# Patient Record
Sex: FEMALE | Race: WHITE | NOT HISPANIC OR LATINO | Employment: OTHER | URBAN - NONMETROPOLITAN AREA
[De-identification: names, ages, dates, MRNs, and addresses within clinical notes are randomized per-mention and may not be internally consistent; named-entity substitution may affect disease eponyms.]

---

## 2021-01-22 ENCOUNTER — TELEPHONE (OUTPATIENT)
Dept: CARDIOLOGY | Facility: CLINIC | Age: 86
End: 2021-01-22

## 2021-01-22 NOTE — TELEPHONE ENCOUNTER
DR. DARIUS CHAMBERS , A GERIATRIC REHABILITAION DOCTOR, SENDING YOU EKG RESULTS FOR YOU TO LOOK AT,,,,,,,,,,,,

## 2021-08-30 ENCOUNTER — OFFICE VISIT (OUTPATIENT)
Dept: CARDIOLOGY | Facility: CLINIC | Age: 86
End: 2021-08-30

## 2021-08-30 VITALS
DIASTOLIC BLOOD PRESSURE: 68 MMHG | TEMPERATURE: 99 F | WEIGHT: 114 LBS | SYSTOLIC BLOOD PRESSURE: 114 MMHG | BODY MASS INDEX: 18.99 KG/M2 | HEART RATE: 89 BPM | RESPIRATION RATE: 12 BRPM | OXYGEN SATURATION: 99 % | HEIGHT: 65 IN

## 2021-08-30 DIAGNOSIS — I27.82 CHRONIC PULMONARY EMBOLISM, UNSPECIFIED PULMONARY EMBOLISM TYPE, UNSPECIFIED WHETHER ACUTE COR PULMONALE PRESENT (HCC): ICD-10-CM

## 2021-08-30 DIAGNOSIS — E78.5 HYPERLIPIDEMIA LDL GOAL <100: ICD-10-CM

## 2021-08-30 DIAGNOSIS — I48.20 ATRIAL FIBRILLATION, CHRONIC (HCC): ICD-10-CM

## 2021-08-30 DIAGNOSIS — I10 ESSENTIAL HYPERTENSION: Primary | ICD-10-CM

## 2021-08-30 PROCEDURE — 99214 OFFICE O/P EST MOD 30 MIN: CPT | Performed by: INTERNAL MEDICINE

## 2021-08-30 PROCEDURE — 93000 ELECTROCARDIOGRAM COMPLETE: CPT | Performed by: INTERNAL MEDICINE

## 2021-08-30 RX ORDER — METOPROLOL SUCCINATE 50 MG/1
50 TABLET, EXTENDED RELEASE ORAL DAILY
Qty: 90 TABLET | Refills: 1 | Status: SHIPPED | OUTPATIENT
Start: 2021-08-30 | End: 2022-02-23 | Stop reason: SDUPTHER

## 2021-08-30 NOTE — PROGRESS NOTES
[facility] [place]  [facility] CARDIOLOGY  [address] [place] [address]  Dept: [phone]  Dept Fax: [phone]    Carol Janeway  1935    New Patient Office Note    History of Present Illness:  Carol Janeway is a 86 y.o. female who presents to the clinic for New patient. For Chronic atrial fibrillation- ,She has seen [facility] cardiology, has chronic atrial fibrillation,and was taking warfarin, ,she has had a stroke, bleeding intraventricular in 12.2020, her warfarin was stop and according to Dr Kennedy the neurosurgeon was advised to restart in March 2021,however patient has not done it, she denies any complaints, her daughter whom I called her to find out more , did not know what kind of stroke or if she was told to restart the blood thinner, she was told by the PA who works with Dr Kennedy to start in 3 march the blood thinner, . She denies any palpitations, SOB, bleeding, chest pain, her Hr is fast 110, she has stop the Toprol also for some reason,no available to me, neither the patient or her daughter knows why, after [facility] she has been at the rehab center for PT,  Her BP is 140.60 on Exforge 5.320. and she takes Crestor 20 mg, no blood thinner at this point, , we have contact her daughter and we discuss this, she is planning to talk to Dr Kennedy, regarding the blood thinner, we discuss briefly about the watchman device ,Will start Toprol xl 50 mg  The following portions of the patient's history were reviewed and updated as appropriate: allergies, current medications, past family history, past medical history, past social history, past surgical history and problem list.    Medications:  amantadine  amLODIPine-valsartan  budesonide-formoterol  dicyclomine  folic acid  melatonin tablet  nicotine  rosuvastatin  tiotropium bromide monohydrate aerosol solution    Subjective  Allergies   Allergen Reactions   • Sulfamethoxazole Hives   • Trimethoprim Nausea And Vomiting        Past  Medical History:   Diagnosis Date   • Acute pulmonary embolism without acute cor pulmonale (CMS/HCC)    • Alcohol use    • At high risk for falls    • Atherosclerosis of native coronary artery of native heart without angina pectoris    • Benign hypertension    • CAD (coronary artery disease)    • Chronic atrial fibrillation (CMS/HCC)    • Chronic obstructive lung disease (CMS/HCC)    • COPD with chronic bronchitis (CMS/HCC)    • Coronary arteriosclerosis in native artery    • High risk medication use    • History of maternal deep vein thrombosis (DVT)    • Hyperlipidemia    • MVP (mitral valve prolapse)     NOT SEEN ON RECENT ECHO   • Osteoporosis    • Pulmonary embolism (CMS/HCC)    • Pulmonary thromboembolism (CMS/HCC)    • Pure hypercholesterolemia    • Thrombocytopenia (CMS/HCC)    • Tobacco dependence syndrome    • Tobacco user        Past Surgical History:   Procedure Laterality Date   • MOHS SURGERY      X2       Family History   Problem Relation Age of Onset   • Stroke Father    • Hypertension Father    • Diabetes Other         Social History     Socioeconomic History   • Marital status:      Spouse name: Not on file   • Number of children: Not on file   • Years of education: Not on file   • Highest education level: Not on file   Tobacco Use   • Smoking status: Former Smoker     Packs/day: 1.00     Types: Cigarettes     Quit date:      Years since quittin.6   • Smokeless tobacco: Never Used   Vaping Use   • Vaping Use: Never used   Substance and Sexual Activity   • Alcohol use: Never   • Drug use: Never   • Sexual activity: Defer       Review of Systems   Constitutional: Negative.    HENT: Negative.    Respiratory: Negative.    Cardiovascular: Negative.    Endocrine: Negative.    Genitourinary: Negative.    Musculoskeletal: Negative.    Skin: Negative.    Allergic/Immunologic: Negative.    Neurological: Negative.    Hematological: Negative.    Psychiatric/Behavioral: Negative.    All other  "systems reviewed and are negative.      Cardiovascular Procedures    ECHO/MUGA:   STRESS TESTS:   CARDIAC CATH:   DEVICES:   HOLTER:   CT/MRI:   VASCULAR:   CARDIOTHORACIC:     Objective  Vitals:    08/30/21 1001   BP: 114/68   BP Location: Right arm   Patient Position: Sitting   Cuff Size: Adult   Pulse: 89   Resp: 12   Temp: 99 °F (37.2 °C)   TempSrc: Infrared   SpO2: 99%   Weight: 51.7 kg (114 lb)   Height: 165.1 cm (65\")   PainSc: 0-No pain       Physical Exam  Constitutional:       Appearance: Healthy appearance. Not in distress.   Neck:      Vascular: No JVR. JVD normal.   Pulmonary:      Effort: Pulmonary effort is normal.      Breath sounds: Normal breath sounds. No wheezing. No rhonchi. No rales.   Chest:      Chest wall: Not tender to palpatation.   Cardiovascular:      PMI at left midclavicular line. Normal rate. Irregularly irregular rhythm. Normal S1. Normal S2.      Murmurs: There is no murmur.      No gallop. No click. No rub.   Pulses:     Intact distal pulses.   Edema:     Peripheral edema absent.   Abdominal:      General: Bowel sounds are normal.      Palpations: Abdomen is soft.      Tenderness: There is no abdominal tenderness.   Musculoskeletal: Normal range of motion.         General: No tenderness. Skin:     General: Skin is warm and dry.   Neurological:      General: No focal deficit present.      Mental Status: Alert and oriented to person, place and time.          Diagnostic Data    ECG 12 Lead    Date/Time: 8/30/2021 4:06 PM  Performed by: Rufus Bradshaw MD  Authorized by: Rufus Bradshaw MD   Comparison: compared with previous ECG   Similar to previous ECG  Rhythm: atrial fibrillation  Rate: tachycardic  BPM: 110  QRS axis: normal    Clinical impression: abnormal EKG            Assessment and Plan  Diagnoses and all orders for this visit:    Essential hypertension- BP is 140.60., on Exforge 5,320.,, will restart Toprol xl 50 mg    Atrial fibrillation, chronic (CMS/HCC)- " rate control ,HR is fast , will restart Toprol xl 50 mg, will hold blood thinner until patient and daughter. Maker her mind,     Hyperlipidemia LDL goal <100- On Crestor 20 mg    Chronic pulmonary embolism, unspecified pulmonary embolism type, unspecified whether acute cor pulmonale present (CMS/[facility])- she has not been taking any blood thinner        Return in about 1 month (around 9/30/2021) for Recheck.    Rufus Bradshaw MD  08/30/2021

## 2021-08-31 ENCOUNTER — TELEPHONE (OUTPATIENT)
Dept: CARDIOLOGY | Facility: CLINIC | Age: 86
End: 2021-08-31

## 2021-08-31 LAB
ALBUMIN SERPL-MCNC: 4.6 G/DL (ref 3.6–4.6)
ALBUMIN/GLOB SERPL: 1.6 {RATIO} (ref 1.2–2.2)
ALP SERPL-CCNC: 122 IU/L (ref 48–121)
ALT SERPL-CCNC: 13 IU/L (ref 0–32)
AST SERPL-CCNC: 26 IU/L (ref 0–40)
BASOPHILS # BLD AUTO: 0 X10E3/UL (ref 0–0.2)
BASOPHILS NFR BLD AUTO: 0 %
BILIRUB SERPL-MCNC: 1 MG/DL (ref 0–1.2)
BUN SERPL-MCNC: 19 MG/DL (ref 8–27)
BUN/CREAT SERPL: 30 (ref 12–28)
CALCIUM SERPL-MCNC: 10 MG/DL (ref 8.7–10.3)
CHLORIDE SERPL-SCNC: 104 MMOL/L (ref 96–106)
CO2 SERPL-SCNC: 23 MMOL/L (ref 20–29)
CREAT SERPL-MCNC: 0.63 MG/DL (ref 0.57–1)
EOSINOPHIL # BLD AUTO: 0 X10E3/UL (ref 0–0.4)
EOSINOPHIL NFR BLD AUTO: 1 %
ERYTHROCYTE [DISTWIDTH] IN BLOOD BY AUTOMATED COUNT: 13.9 % (ref 11.7–15.4)
GLOBULIN SER CALC-MCNC: 2.8 G/DL (ref 1.5–4.5)
GLUCOSE SERPL-MCNC: 92 MG/DL (ref 65–99)
HCT VFR BLD AUTO: 38.6 % (ref 34–46.6)
HGB BLD-MCNC: 13 G/DL (ref 11.1–15.9)
IMM GRANULOCYTES # BLD AUTO: 0 X10E3/UL (ref 0–0.1)
IMM GRANULOCYTES NFR BLD AUTO: 0 %
LYMPHOCYTES # BLD AUTO: 1.2 X10E3/UL (ref 0.7–3.1)
LYMPHOCYTES NFR BLD AUTO: 15 %
MCH RBC QN AUTO: 31.9 PG (ref 26.6–33)
MCHC RBC AUTO-ENTMCNC: 33.7 G/DL (ref 31.5–35.7)
MCV RBC AUTO: 95 FL (ref 79–97)
MONOCYTES # BLD AUTO: 0.6 X10E3/UL (ref 0.1–0.9)
MONOCYTES NFR BLD AUTO: 8 %
NEUTROPHILS # BLD AUTO: 5.9 X10E3/UL (ref 1.4–7)
NEUTROPHILS NFR BLD AUTO: 76 %
PLATELET # BLD AUTO: 158 X10E3/UL (ref 150–450)
POTASSIUM SERPL-SCNC: 4.3 MMOL/L (ref 3.5–5.2)
PROT SERPL-MCNC: 7.4 G/DL (ref 6–8.5)
RBC # BLD AUTO: 4.08 X10E6/UL (ref 3.77–5.28)
SODIUM SERPL-SCNC: 142 MMOL/L (ref 134–144)
WBC # BLD AUTO: 7.8 X10E3/UL (ref 3.4–10.8)

## 2021-08-31 NOTE — TELEPHONE ENCOUNTER
Lab is good, please ask about if the daughter was able to talk to Dr VASQUEZ? For us to start on Blood thinner ? Pt could have stroke not being on blood thinner. Left message for pt

## 2021-08-31 NOTE — TELEPHONE ENCOUNTER
Lab is good, please ask about if the daughter was able to talk to Dr VASQUEZ? For us to start on Blood thinner .  I spoke with pt ionaor she is going to call pt and ask her what she wants to do . Pt will call us tomorrow .

## 2021-09-02 ENCOUNTER — TELEPHONE (OUTPATIENT)
Dept: CARDIOLOGY | Facility: CLINIC | Age: 86
End: 2021-09-02

## 2021-09-07 ENCOUNTER — TELEPHONE (OUTPATIENT)
Dept: CARDIOLOGY | Facility: CLINIC | Age: 86
End: 2021-09-07

## 2021-09-07 NOTE — TELEPHONE ENCOUNTER
amLODIPine-valsartan (EXFORGE) 5-320 MG per tablet    [facility] [place], [place] - [address] AT [facility] [address] - [phone]  - [phone] FX          90 day

## 2021-09-08 RX ORDER — AMLODIPINE AND VALSARTAN 5; 320 MG/1; MG/1
1 TABLET ORAL DAILY
Qty: 90 TABLET | Refills: 3 | Status: SHIPPED | OUTPATIENT
Start: 2021-09-08 | End: 2021-10-18

## 2021-09-13 ENCOUNTER — OFFICE VISIT (OUTPATIENT)
Dept: CARDIOLOGY | Facility: CLINIC | Age: 86
End: 2021-09-13

## 2021-09-13 VITALS
HEIGHT: 65 IN | HEART RATE: 76 BPM | TEMPERATURE: 97.1 F | OXYGEN SATURATION: 92 % | SYSTOLIC BLOOD PRESSURE: 128 MMHG | WEIGHT: 115 LBS | DIASTOLIC BLOOD PRESSURE: 80 MMHG | RESPIRATION RATE: 15 BRPM | BODY MASS INDEX: 19.16 KG/M2

## 2021-09-13 DIAGNOSIS — I48.20 ATRIAL FIBRILLATION, CHRONIC (HCC): Primary | ICD-10-CM

## 2021-09-13 DIAGNOSIS — I10 ESSENTIAL HYPERTENSION: ICD-10-CM

## 2021-09-13 DIAGNOSIS — E78.5 HYPERLIPIDEMIA LDL GOAL <100: ICD-10-CM

## 2021-09-13 DIAGNOSIS — I27.82 CHRONIC PULMONARY EMBOLISM, UNSPECIFIED PULMONARY EMBOLISM TYPE, UNSPECIFIED WHETHER ACUTE COR PULMONALE PRESENT (HCC): ICD-10-CM

## 2021-09-13 PROCEDURE — 99214 OFFICE O/P EST MOD 30 MIN: CPT | Performed by: INTERNAL MEDICINE

## 2021-09-13 NOTE — PROGRESS NOTES
[facility] [place]  [facility] CARDIOLOGY  [address] [place] [address]  Dept: [phone]  Dept Fax: [phone]    Carol Janeway  1935    Follow Up Office Visit Note    History of Present Illness:  Carol Janeway is a 86 y.o. female who presents to the clinic for Follow-up. Chronic atrial fibrillation- She is asymptomatic, HR is better 94. From 110, on Toprol xl 50 mg, also started taking Eliquis 5mg bid, , no bleeding. Will keep same meds, will see her back in 1 month, we might need to increase Toprol     The following portions of the patient's history were reviewed and updated as appropriate: allergies, current medications, past family history, past medical history, past social history, past surgical history and problem list.    Medications:  amantadine  amLODIPine-valsartan  apixaban tablet  budesonide-formoterol  dicyclomine  folic acid  melatonin tablet  metoprolol succinate XL  nicotine  rosuvastatin  tiotropium bromide monohydrate aerosol solution    Subjective  Allergies   Allergen Reactions   • Sulfamethoxazole Hives   • Trimethoprim Nausea And Vomiting        Past Medical History:   Diagnosis Date   • Acute pulmonary embolism without acute cor pulmonale (CMS/HCC)    • Alcohol use    • At high risk for falls    • Atherosclerosis of native coronary artery of native heart without angina pectoris    • Benign hypertension    • CAD (coronary artery disease)    • Chronic atrial fibrillation (CMS/HCC)    • Chronic obstructive lung disease (CMS/HCC)    • COPD with chronic bronchitis (CMS/HCC)    • Coronary arteriosclerosis in native artery    • High risk medication use    • History of maternal deep vein thrombosis (DVT)    • Hyperlipidemia    • MVP (mitral valve prolapse)     NOT SEEN ON RECENT ECHO   • Osteoporosis    • Pulmonary embolism (CMS/HCC)    • Pulmonary thromboembolism (CMS/HCC)    • Pure hypercholesterolemia    • Thrombocytopenia (CMS/HCC)    • Tobacco dependence  "syndrome    • Tobacco user        Past Surgical History:   Procedure Laterality Date   • MOHS SURGERY      X2       Family History   Problem Relation Age of Onset   • Stroke Father    • Hypertension Father    • Diabetes Other         Social History     Socioeconomic History   • Marital status:      Spouse name: Not on file   • Number of children: Not on file   • Years of education: Not on file   • Highest education level: Not on file   Tobacco Use   • Smoking status: Former Smoker     Packs/day: 1.00     Types: Cigarettes     Quit date:      Years since quittin.7   • Smokeless tobacco: Never Used   Vaping Use   • Vaping Use: Never used   Substance and Sexual Activity   • Alcohol use: Never   • Drug use: Never   • Sexual activity: Defer       Review of Systems   Constitutional: Negative.    HENT: Negative.    Respiratory: Negative.    Cardiovascular: Negative.    Endocrine: Negative.    Genitourinary: Negative.    Musculoskeletal: Negative.    Skin: Negative.    Allergic/Immunologic: Negative.    Neurological: Negative.    Hematological: Negative.    Psychiatric/Behavioral: Negative.    All other systems reviewed and are negative.      Cardiovascular Procedures    ECHO/MUGA:   STRESS TESTS:   CARDIAC CATH:   DEVICES:   HOLTER:   CT/MRI:   VASCULAR:   CARDIOTHORACIC:     Objective  Vitals:    21 1548   BP: 128/80   BP Location: Left arm   Patient Position: Sitting   Cuff Size: Adult   Pulse: 76   Resp: 15   Temp: 97.1 °F (36.2 °C)   TempSrc: Infrared   SpO2: 92%   Weight: 52.2 kg (115 lb)   Height: 165.1 cm (65\")   PainSc: 0-No pain     Body mass index is 19.14 kg/m².     Physical Exam  Constitutional:       Appearance: Healthy appearance. Not in distress.   Neck:      Vascular: No JVR. JVD normal.   Pulmonary:      Effort: Pulmonary effort is normal.      Breath sounds: Normal breath sounds. No wheezing. No rhonchi. No rales.   Chest:      Chest wall: Not tender to palpatation.   Cardiovascular: "      PMI at left midclavicular line. Normal rate. Irregularly irregular rhythm. Normal S1. Normal S2.      Murmurs: There is no murmur.      No gallop. No click. No rub.   Pulses:     Intact distal pulses.   Edema:     Peripheral edema absent.   Abdominal:      General: Bowel sounds are normal.      Palpations: Abdomen is soft.      Tenderness: There is no abdominal tenderness.   Musculoskeletal: Normal range of motion.         General: No tenderness. Skin:     General: Skin is warm and dry.   Neurological:      General: No focal deficit present.      Mental Status: Alert and oriented to person, place and time.          Diagnostic Data  Procedures    Assessment and Plan  Diagnoses and all orders for this visit:    Atrial fibrillation, chronic (CMS/HCC)- Rate control, better on Toprol xl 50 mg, and also on Eliquis, , will see her back in 1 month    Essential hypertension- The BP is 110.60 on Toprol xl 50 mg and Exforge 5,320,    Hyperlipidemia LDL goal <100- On Crestor 20 mg    Chronic pulmonary embolism, unspecified pulmonary embolism type, unspecified whether acute cor pulmonale present (CMS/[facility])- Now also on Eliquis          Return in about 1 month (around 10/13/2021) for Recheck.    Rufus Bradshaw MD  09/13/2021

## 2021-10-11 ENCOUNTER — OFFICE VISIT (OUTPATIENT)
Dept: CARDIOLOGY | Facility: CLINIC | Age: 86
End: 2021-10-11

## 2021-10-11 VITALS
DIASTOLIC BLOOD PRESSURE: 74 MMHG | HEIGHT: 65 IN | SYSTOLIC BLOOD PRESSURE: 118 MMHG | BODY MASS INDEX: 20.66 KG/M2 | TEMPERATURE: 97 F | WEIGHT: 124 LBS | RESPIRATION RATE: 12 BRPM | OXYGEN SATURATION: 88 % | HEART RATE: 86 BPM

## 2021-10-11 DIAGNOSIS — I27.82 CHRONIC PULMONARY EMBOLISM, UNSPECIFIED PULMONARY EMBOLISM TYPE, UNSPECIFIED WHETHER ACUTE COR PULMONALE PRESENT (HCC): ICD-10-CM

## 2021-10-11 DIAGNOSIS — E78.5 HYPERLIPIDEMIA LDL GOAL <100: ICD-10-CM

## 2021-10-11 DIAGNOSIS — I48.20 ATRIAL FIBRILLATION, CHRONIC (HCC): Primary | ICD-10-CM

## 2021-10-11 DIAGNOSIS — R06.02 SHORTNESS OF BREATH: ICD-10-CM

## 2021-10-11 DIAGNOSIS — I10 ESSENTIAL HYPERTENSION: ICD-10-CM

## 2021-10-11 PROCEDURE — 99214 OFFICE O/P EST MOD 30 MIN: CPT | Performed by: INTERNAL MEDICINE

## 2021-10-11 RX ORDER — TORSEMIDE 100 MG/1
100 TABLET ORAL DAILY
Qty: 30 TABLET | Refills: 0 | Status: SHIPPED | OUTPATIENT
Start: 2021-10-11 | End: 2021-10-18

## 2021-10-11 NOTE — PROGRESS NOTES
[facility] [place]  [facility] CARDIOLOGY  [address] [place] [address]  Dept: [phone]  Dept Fax: [phone]    Carol Janeway  1935    Follow Up Office Visit Note    History of Present Illness:  Carol Janeway is a 86 y.o. female who presents to the clinic for Follow-up, Shortness of Breath, and Edema.  For the last few weeks has noticed SOB and edema, last night could not sleep due to SOB, the legs has 2-3 plus edema , she has had CXR with Dr Hart and looks abnormal, , by clinical exam she has crackles in both bases, BP is 105.60, will use Torsemide 100 mg, he seems to have CHF, will get an echo, and also BNP , will see her  back in 1 week, patient was advised to go to ER if she gets worse     The following portions of the patient's history were reviewed and updated as appropriate: allergies, current medications, past family history, past medical history, past social history, past surgical history and problem list.    Medications:  amantadine  amLODIPine-valsartan  apixaban tablet  budesonide-formoterol  dicyclomine  folic acid  melatonin tablet  metoprolol succinate XL  nicotine  rosuvastatin  tiotropium bromide monohydrate aerosol solution  torsemide    Subjective  Allergies   Allergen Reactions   • Sulfamethoxazole Hives   • Trimethoprim Nausea And Vomiting        Past Medical History:   Diagnosis Date   • Acute pulmonary embolism without acute cor pulmonale (HCC)    • Alcohol use    • At high risk for falls    • Atherosclerosis of native coronary artery of native heart without angina pectoris    • Benign hypertension    • CAD (coronary artery disease)    • Chronic atrial fibrillation (HCC)    • Chronic obstructive lung disease (HCC)    • COPD with chronic bronchitis (HCC)    • Coronary arteriosclerosis in native artery    • High risk medication use    • History of maternal deep vein thrombosis (DVT)    • Hyperlipidemia    • MVP (mitral valve prolapse)     NOT SEEN ON  "RECENT ECHO   • Osteoporosis    • Pulmonary embolism (HCC)    • Pulmonary thromboembolism (HCC)    • Pure hypercholesterolemia    • Thrombocytopenia (HCC)    • Tobacco dependence syndrome    • Tobacco user        Past Surgical History:   Procedure Laterality Date   • MOHS SURGERY      X2       Family History   Problem Relation Age of Onset   • Stroke Father    • Hypertension Father    • Diabetes Other         Social History     Socioeconomic History   • Marital status:    Tobacco Use   • Smoking status: Former Smoker     Packs/day: 1.00     Types: Cigarettes     Quit date:      Years since quittin.7   • Smokeless tobacco: Never Used   Vaping Use   • Vaping Use: Never used   Substance and Sexual Activity   • Alcohol use: Never   • Drug use: Never   • Sexual activity: Defer       Review of Systems   Constitutional: Negative.    HENT: Negative.    Respiratory: Positive for shortness of breath.    Cardiovascular: Positive for leg swelling.   Endocrine: Negative.    Genitourinary: Negative.    Musculoskeletal: Negative.    Skin: Negative.    Allergic/Immunologic: Negative.    Neurological: Negative.    Hematological: Negative.    Psychiatric/Behavioral: Negative.        Cardiovascular Procedures    ECHO/MUGA:   STRESS TESTS:   CARDIAC CATH:   DEVICES:   HOLTER:   CT/MRI:   VASCULAR:   CARDIOTHORACIC:     Objective  Vitals:    10/11/21 1455   BP: 118/74   BP Location: Left arm   Patient Position: Sitting   Cuff Size: Adult   Pulse: 86   Resp: 12   Temp: 97 °F (36.1 °C)   TempSrc: Infrared   SpO2: (!) 88%   Weight: 56.2 kg (124 lb)   Height: 165.1 cm (65\")   PainSc: 0-No pain     Body mass index is 20.63 kg/m².     Physical Exam  Constitutional:       Appearance: Healthy appearance. Not in distress.   Neck:      Vascular: No JVR. JVD normal.   Pulmonary:      Effort: Pulmonary effort is normal.      Breath sounds: Normal breath sounds. No wheezing. No rhonchi. No rales.   Chest:      Chest wall: Not tender " to palpatation.   Cardiovascular:      PMI at left midclavicular line. Normal rate. Regular rhythm. Normal S1. Normal S2.      Murmurs: There is no murmur.      No gallop. No click. No rub.   Pulses:     Intact distal pulses.   Edema:     Thigh: bilateral 2+ edema of the thigh.     Pretibial: bilateral 2+ edema of the pretibial area.     Ankle: bilateral 2+ edema of the ankle.     Feet: bilateral 2+ edema of the feet.  Abdominal:      General: Bowel sounds are normal.      Palpations: Abdomen is soft.      Tenderness: There is no abdominal tenderness.   Musculoskeletal: Normal range of motion.         General: No tenderness. Skin:     General: Skin is warm and dry.   Neurological:      General: No focal deficit present.      Mental Status: Alert and oriented to person, place and time.          Diagnostic Data  Procedures    Assessment and Plan  Diagnoses and all orders for this visit:    Atrial fibrillation, chronic (HCC)- Rate control on Toprol xl 50 mg and Eliquis 5mg bid  -     Adult Transthoracic Echo Complete W/ Cont if Necessary Per Protocol; Future  -     Comprehensive Metabolic Panel  -     CBC & Differential    Essential hypertension- the BP is 105.60, on Toprol xl 50 mg  And Exforge 5,320,    Hyperlipidemia LDL goal <100- On Crestor     Chronic pulmonary embolism, unspecified pulmonary embolism type, unspecified whether acute cor pulmonale present (HCC)- On Eliquis     Shortness of breath- Seems likely heart failure - , edema, SOB, will get a BNP., will get an echo and also will sent Torsemide 100 mg daily  -     proBNP    Other orders  -     torsemide (DEMADEX) 100 MG tablet; Take 1 tablet by mouth Daily.         Return in about 1 week (around 10/18/2021) for Recheck.    Rufus Bradshaw MD  10/11/2021

## 2021-10-12 ENCOUNTER — LAB (OUTPATIENT)
Dept: CARDIOLOGY | Facility: CLINIC | Age: 86
End: 2021-10-12

## 2021-10-13 ENCOUNTER — TELEPHONE (OUTPATIENT)
Dept: CARDIOLOGY | Facility: CLINIC | Age: 86
End: 2021-10-13

## 2021-10-13 LAB
ALBUMIN SERPL-MCNC: 4.2 G/DL (ref 3.6–4.6)
ALBUMIN/GLOB SERPL: 1.4 {RATIO} (ref 1.2–2.2)
ALP SERPL-CCNC: 110 IU/L (ref 44–121)
ALT SERPL-CCNC: 13 IU/L (ref 0–32)
AST SERPL-CCNC: 35 IU/L (ref 0–40)
BASOPHILS # BLD AUTO: 0 X10E3/UL (ref 0–0.2)
BASOPHILS NFR BLD AUTO: 0 %
BILIRUB SERPL-MCNC: 1.5 MG/DL (ref 0–1.2)
BUN SERPL-MCNC: 20 MG/DL (ref 8–27)
BUN/CREAT SERPL: 21 (ref 12–28)
CALCIUM SERPL-MCNC: 9.5 MG/DL (ref 8.7–10.3)
CHLORIDE SERPL-SCNC: 94 MMOL/L (ref 96–106)
CO2 SERPL-SCNC: 23 MMOL/L (ref 20–29)
CREAT SERPL-MCNC: 0.95 MG/DL (ref 0.57–1)
EOSINOPHIL # BLD AUTO: 0 X10E3/UL (ref 0–0.4)
EOSINOPHIL NFR BLD AUTO: 0 %
ERYTHROCYTE [DISTWIDTH] IN BLOOD BY AUTOMATED COUNT: 15.3 % (ref 11.7–15.4)
GLOBULIN SER CALC-MCNC: 3 G/DL (ref 1.5–4.5)
GLUCOSE SERPL-MCNC: 88 MG/DL (ref 65–99)
HCT VFR BLD AUTO: 39.4 % (ref 34–46.6)
HGB BLD-MCNC: 12.9 G/DL (ref 11.1–15.9)
IMM GRANULOCYTES # BLD AUTO: 0 X10E3/UL (ref 0–0.1)
IMM GRANULOCYTES NFR BLD AUTO: 0 %
LYMPHOCYTES # BLD AUTO: 1.3 X10E3/UL (ref 0.7–3.1)
LYMPHOCYTES NFR BLD AUTO: 14 %
MCH RBC QN AUTO: 31.3 PG (ref 26.6–33)
MCHC RBC AUTO-ENTMCNC: 32.7 G/DL (ref 31.5–35.7)
MCV RBC AUTO: 96 FL (ref 79–97)
MONOCYTES # BLD AUTO: 0.8 X10E3/UL (ref 0.1–0.9)
MONOCYTES NFR BLD AUTO: 9 %
NEUTROPHILS # BLD AUTO: 7 X10E3/UL (ref 1.4–7)
NEUTROPHILS NFR BLD AUTO: 77 %
NT-PROBNP SERPL-MCNC: 4280 PG/ML (ref 0–738)
PLATELET # BLD AUTO: 197 X10E3/UL (ref 150–450)
POTASSIUM SERPL-SCNC: 3 MMOL/L (ref 3.5–5.2)
PROT SERPL-MCNC: 7.2 G/DL (ref 6–8.5)
RBC # BLD AUTO: 4.12 X10E6/UL (ref 3.77–5.28)
SODIUM SERPL-SCNC: 140 MMOL/L (ref 134–144)
WBC # BLD AUTO: 9.3 X10E3/UL (ref 3.4–10.8)

## 2021-10-13 RX ORDER — POTASSIUM CHLORIDE 1500 MG/1
TABLET, FILM COATED, EXTENDED RELEASE ORAL
Qty: 60 TABLET | Refills: 1 | Status: SHIPPED | OUTPATIENT
Start: 2021-10-13 | End: 2021-12-27

## 2021-10-13 NOTE — TELEPHONE ENCOUNTER
----- Message from Rufus Bradshaw MD sent at 10/13/2021  1:17 PM EDT -----  Add KCL 40 meq daily. Potassium is very low 3.,0., also add magnesium levels .

## 2021-10-13 NOTE — TELEPHONE ENCOUNTER
Tried calling pt daughter and give her, her moms lab results, please let her know that her moms potassium was low so Dr. Bradshaw sent in a med to her pharmacy and that her BNP was elevated which indicated heartfailure.

## 2021-10-18 ENCOUNTER — OFFICE VISIT (OUTPATIENT)
Dept: CARDIOLOGY | Facility: CLINIC | Age: 86
End: 2021-10-18

## 2021-10-18 VITALS
SYSTOLIC BLOOD PRESSURE: 110 MMHG | RESPIRATION RATE: 12 BRPM | OXYGEN SATURATION: 96 % | BODY MASS INDEX: 17.49 KG/M2 | HEART RATE: 86 BPM | DIASTOLIC BLOOD PRESSURE: 66 MMHG | WEIGHT: 105 LBS | HEIGHT: 65 IN | TEMPERATURE: 99 F

## 2021-10-18 DIAGNOSIS — E78.5 HYPERLIPIDEMIA LDL GOAL <100: ICD-10-CM

## 2021-10-18 DIAGNOSIS — I48.20 ATRIAL FIBRILLATION, CHRONIC (HCC): Primary | ICD-10-CM

## 2021-10-18 DIAGNOSIS — I27.82 CHRONIC PULMONARY EMBOLISM, UNSPECIFIED PULMONARY EMBOLISM TYPE, UNSPECIFIED WHETHER ACUTE COR PULMONALE PRESENT (HCC): ICD-10-CM

## 2021-10-18 DIAGNOSIS — R06.02 SHORTNESS OF BREATH: ICD-10-CM

## 2021-10-18 DIAGNOSIS — I10 ESSENTIAL HYPERTENSION: ICD-10-CM

## 2021-10-18 PROCEDURE — 99214 OFFICE O/P EST MOD 30 MIN: CPT | Performed by: INTERNAL MEDICINE

## 2021-10-18 NOTE — PROGRESS NOTES
[facility] [place]  [facility] CARDIOLOGY  [address] [place] [address]  Dept: [phone]  Dept Fax: [phone]    Carol Janeway  1935    Follow Up Office Visit Note    History of Present Illness:  Carol Janeway is a 86 y.o. female who presents to the clinic for Follow-up.CHF- She seems doing better, on torsemide 100 mg, echo moderate diastolic heart failure., and BNP over 4000, will get a BMP, will hold Torsemide, lungs.,are clear, no edema,     The following portions of the patient's history were reviewed and updated as appropriate: allergies, current medications, past family history, past medical history, past social history, past surgical history and problem list.    Medications:  amantadine  apixaban tablet  budesonide-formoterol  dicyclomine  folic acid  melatonin tablet  metoprolol succinate XL  nicotine  potassium chloride ER tablet controlled-release  rosuvastatin  tiotropium bromide monohydrate aerosol solution  torsemide    Subjective  Allergies   Allergen Reactions   • Sulfamethoxazole Hives   • Trimethoprim Nausea And Vomiting        Past Medical History:   Diagnosis Date   • Acute pulmonary embolism without acute cor pulmonale (HCC)    • Alcohol use    • At high risk for falls    • Atherosclerosis of native coronary artery of native heart without angina pectoris    • Benign hypertension    • CAD (coronary artery disease)    • Chronic atrial fibrillation (HCC)    • Chronic obstructive lung disease (HCC)    • COPD with chronic bronchitis (HCC)    • Coronary arteriosclerosis in native artery    • High risk medication use    • History of maternal deep vein thrombosis (DVT)    • Hyperlipidemia    • MVP (mitral valve prolapse)     NOT SEEN ON RECENT ECHO   • Osteoporosis    • Pulmonary embolism (HCC)    • Pulmonary thromboembolism (HCC)    • Pure hypercholesterolemia    • Thrombocytopenia (HCC)    • Tobacco dependence syndrome    • Tobacco user        Past Surgical  "History:   Procedure Laterality Date   • MOHS SURGERY      X2       Family History   Problem Relation Age of Onset   • Stroke Father    • Hypertension Father    • Diabetes Other         Social History     Socioeconomic History   • Marital status:    Tobacco Use   • Smoking status: Former Smoker     Packs/day: 1.00     Types: Cigarettes     Quit date:      Years since quittin.7   • Smokeless tobacco: Never Used   Vaping Use   • Vaping Use: Never used   Substance and Sexual Activity   • Alcohol use: Never   • Drug use: Never   • Sexual activity: Defer       Review of Systems   Constitutional: Negative.    HENT: Negative.    Respiratory: Negative.    Cardiovascular: Negative.    Endocrine: Negative.    Genitourinary: Negative.    Musculoskeletal: Negative.    Skin: Negative.    Allergic/Immunologic: Negative.    Neurological: Negative.    Hematological: Negative.    Psychiatric/Behavioral: Negative.        Cardiovascular Procedures    ECHO/MUGA:   STRESS TESTS:   CARDIAC CATH:   DEVICES:   HOLTER:   CT/MRI:   VASCULAR:   CARDIOTHORACIC:     Objective  Vitals:    10/18/21 1303   BP: 110/66   BP Location: Left arm   Patient Position: Sitting   Cuff Size: Adult   Pulse: 86   Resp: 12   Temp: 99 °F (37.2 °C)   TempSrc: Infrared   SpO2: 96%   Weight: 47.6 kg (105 lb)   Height: 165.1 cm (65\")   PainSc: 0-No pain     Body mass index is 17.47 kg/m².     Physical Exam  Constitutional:       Appearance: Healthy appearance. Not in distress.   Neck:      Vascular: No JVR. JVD normal.   Pulmonary:      Effort: Pulmonary effort is normal.      Breath sounds: Normal breath sounds. No wheezing. No rhonchi. No rales.   Chest:      Chest wall: Not tender to palpatation.   Cardiovascular:      PMI at left midclavicular line. Normal rate. Regular rhythm. Normal S1. Normal S2.      Murmurs: There is no murmur.      No gallop. No click. No rub.   Pulses:     Intact distal pulses.   Edema:     Peripheral edema absent. "   Abdominal:      General: Bowel sounds are normal.      Palpations: Abdomen is soft.      Tenderness: There is no abdominal tenderness.   Musculoskeletal: Normal range of motion.         General: No tenderness. Skin:     General: Skin is warm and dry.   Neurological:      General: No focal deficit present.      Mental Status: Alert and oriented to person, place and time.          Diagnostic Data  Procedures    Assessment and Plan  Diagnoses and all orders for this visit:    Atrial fibrillation, chronic (HCC)- Rate control 86 on Toprol xl 50 mg and Eliquis   -     Basic Metabolic Panel    Essential hypertension- The BP is kind of low 70.50, will hold Exforge and diuretic for now,  -     Basic Metabolic Panel    Hyperlipidemia LDL goal <100- on Crestor 40 mg    Shortness of breath- This seems likely related to CHF, Echo normal Ef, moderate diastolic heart failure,  BNP over 4000 m, will Hold diuretics for now , will get a CXR  -     Basic Metabolic Panel         No follow-ups on file.    Rufus Bradshaw MD  10/18/2021

## 2021-10-19 ENCOUNTER — TELEPHONE (OUTPATIENT)
Dept: CARDIOLOGY | Facility: CLINIC | Age: 86
End: 2021-10-19

## 2021-10-19 DIAGNOSIS — R06.02 SHORTNESS OF BREATH: Primary | ICD-10-CM

## 2021-10-19 LAB
BUN SERPL-MCNC: 59 MG/DL (ref 8–27)
BUN/CREAT SERPL: 43 (ref 12–28)
CALCIUM SERPL-MCNC: 9.6 MG/DL (ref 8.7–10.3)
CHLORIDE SERPL-SCNC: 90 MMOL/L (ref 96–106)
CO2 SERPL-SCNC: 33 MMOL/L (ref 20–29)
CREAT SERPL-MCNC: 1.38 MG/DL (ref 0.57–1)
GLUCOSE SERPL-MCNC: 121 MG/DL (ref 65–99)
MAGNESIUM SERPL-MCNC: 2.1 MG/DL (ref 1.6–2.3)
POTASSIUM SERPL-SCNC: 3 MMOL/L (ref 3.5–5.2)
SODIUM SERPL-SCNC: 141 MMOL/L (ref 134–144)

## 2021-10-19 RX ORDER — TORSEMIDE 20 MG/1
20 TABLET ORAL DAILY
Qty: 90 TABLET | Refills: 1 | Status: SHIPPED | OUTPATIENT
Start: 2021-10-19 | End: 2022-05-20

## 2021-10-19 NOTE — TELEPHONE ENCOUNTER
----- Message from Rufus Bradshaw MD sent at 10/19/2021 10:21 AM EDT -----  Creatinine is a little high 1,3 , hold the water pill until Thursday, then start just 20 mg daily, on Friday Torsemide

## 2021-10-20 ENCOUNTER — TELEPHONE (OUTPATIENT)
Dept: CARDIOLOGY | Facility: CLINIC | Age: 86
End: 2021-10-20

## 2021-10-20 DIAGNOSIS — R06.02 SHORTNESS OF BREATH: Primary | ICD-10-CM

## 2021-10-20 NOTE — TELEPHONE ENCOUNTER
Tried calling pt daughter, left a vm to call back, Dr. Bradshaw would like to speak to her about her mothers chest xray

## 2021-11-03 ENCOUNTER — TELEPHONE (OUTPATIENT)
Dept: CARDIOLOGY | Facility: CLINIC | Age: 86
End: 2021-11-03

## 2021-11-03 NOTE — TELEPHONE ENCOUNTER
Ct chest is abnormal, suggesting COPD and possible pneumonia, she needs to see a lung doctor, please contact PCP ,

## 2021-11-04 NOTE — TELEPHONE ENCOUNTER
Called pt daughter Kristen and gave her the message below..    Ct chest is abnormal, suggesting COPD and possible pneumonia, she needs to see a lung doctor, please contact PCP ,

## 2021-11-08 ENCOUNTER — OFFICE VISIT (OUTPATIENT)
Dept: CARDIOLOGY | Facility: CLINIC | Age: 86
End: 2021-11-08

## 2021-11-08 VITALS
TEMPERATURE: 97 F | RESPIRATION RATE: 12 BRPM | HEIGHT: 65 IN | HEART RATE: 64 BPM | DIASTOLIC BLOOD PRESSURE: 60 MMHG | WEIGHT: 104 LBS | SYSTOLIC BLOOD PRESSURE: 90 MMHG | OXYGEN SATURATION: 99 % | BODY MASS INDEX: 17.33 KG/M2

## 2021-11-08 DIAGNOSIS — I10 ESSENTIAL HYPERTENSION: ICD-10-CM

## 2021-11-08 DIAGNOSIS — R06.02 SHORTNESS OF BREATH: ICD-10-CM

## 2021-11-08 DIAGNOSIS — E78.5 HYPERLIPIDEMIA LDL GOAL <100: ICD-10-CM

## 2021-11-08 DIAGNOSIS — I48.20 ATRIAL FIBRILLATION, CHRONIC (HCC): Primary | ICD-10-CM

## 2021-11-08 DIAGNOSIS — I27.82 CHRONIC PULMONARY EMBOLISM, UNSPECIFIED PULMONARY EMBOLISM TYPE, UNSPECIFIED WHETHER ACUTE COR PULMONALE PRESENT (HCC): ICD-10-CM

## 2021-11-08 PROCEDURE — 99214 OFFICE O/P EST MOD 30 MIN: CPT | Performed by: INTERNAL MEDICINE

## 2021-11-08 NOTE — PROGRESS NOTES
[facility]  [facility]  [address] [place] [address]  Dept: [phone]  Dept Fax: [phone]    Carol Janeway  1935    Follow Up Office Visit Note    History of Present Illness:  Carol Janeway is a 86 y.o. female who presents to the clinic for Follow-up. - Shortness of breath- She has diastolic dysfunction,  And she is better on Torsemide 20 mg, , however she has the Ct chest with calcifications of the coronary arteries and also COPD, will set her for a stress nuclear, she was advised to see Dr Hart for her lungs     The following portions of the patient's history were reviewed and updated as appropriate: allergies, current medications, past family history, past medical history, past social history, past surgical history and problem list.    Medications:  amantadine  apixaban tablet  budesonide-formoterol  dicyclomine  folic acid  melatonin tablet  metoprolol succinate XL  nicotine  potassium chloride ER tablet controlled-release  rosuvastatin  tiotropium bromide monohydrate aerosol solution  torsemide    Subjective  Allergies   Allergen Reactions   • Sulfamethoxazole Hives   • Trimethoprim Nausea And Vomiting        Past Medical History:   Diagnosis Date   • Acute pulmonary embolism without acute cor pulmonale (HCC)    • Alcohol use    • At high risk for falls    • Atherosclerosis of native coronary artery of native heart without angina pectoris    • Benign hypertension    • CAD (coronary artery disease)    • Chronic atrial fibrillation (HCC)    • Chronic obstructive lung disease (HCC)    • COPD with chronic bronchitis (HCC)    • Coronary arteriosclerosis in native artery    • High risk medication use    • History of maternal deep vein thrombosis (DVT)    • Hyperlipidemia    • MVP (mitral valve prolapse)     NOT SEEN ON RECENT ECHO   • Osteoporosis    • Pulmonary embolism (HCC)    • Pulmonary thromboembolism (HCC)    • Pure hypercholesterolemia    •  "Thrombocytopenia (HCC)    • Tobacco dependence syndrome    • Tobacco user        Past Surgical History:   Procedure Laterality Date   • MOHS SURGERY      X2       Family History   Problem Relation Age of Onset   • Stroke Father    • Hypertension Father    • Diabetes Other         Social History     Socioeconomic History   • Marital status:    Tobacco Use   • Smoking status: Former Smoker     Packs/day: 1.00     Types: Cigarettes     Quit date:      Years since quittin.8   • Smokeless tobacco: Never Used   Vaping Use   • Vaping Use: Never used   Substance and Sexual Activity   • Alcohol use: Never   • Drug use: Never   • Sexual activity: Defer       Review of Systems   Constitutional: Negative.    HENT: Negative.    Respiratory: Positive for shortness of breath.    Cardiovascular: Negative.    Endocrine: Negative.    Genitourinary: Negative.    Musculoskeletal: Negative.    Skin: Negative.    Allergic/Immunologic: Negative.    Neurological: Negative.    Hematological: Negative.    Psychiatric/Behavioral: Negative.        Cardiovascular Procedures    ECHO/MUGA:   STRESS TESTS:   CARDIAC CATH:   DEVICES:   HOLTER:   CT/MRI:   VASCULAR:   CARDIOTHORACIC:     Objective  Vitals:    21 1427   BP: 90/60   BP Location: Right arm   Patient Position: Lying   Cuff Size: Adult   Pulse: 64   Resp: 12   Temp: 97 °F (36.1 °C)   TempSrc: Infrared   SpO2: 99%   Weight: 47.2 kg (104 lb)   Height: 165.1 cm (65\")   PainSc: 0-No pain     Body mass index is 17.31 kg/m².     Physical Exam  Constitutional:       Appearance: Healthy appearance. Not in distress.   Neck:      Vascular: No JVR. JVD normal.   Pulmonary:      Effort: Pulmonary effort is normal.      Breath sounds: Normal breath sounds. No wheezing. No rhonchi. No rales.   Chest:      Chest wall: Not tender to palpatation.   Cardiovascular:      PMI at left midclavicular line. Normal rate. Regular rhythm. Normal S1. Normal S2.      Murmurs: There is no murmur. "      No gallop. No click. No rub.   Pulses:     Intact distal pulses.   Edema:     Peripheral edema absent.   Abdominal:      General: Bowel sounds are normal.      Palpations: Abdomen is soft.      Tenderness: There is no abdominal tenderness.   Musculoskeletal: Normal range of motion.         General: No tenderness. Skin:     General: Skin is warm and dry.   Neurological:      General: No focal deficit present.      Mental Status: Alert and oriented to person, place and time.          Diagnostic Data  Procedures    Assessment and Plan  Diagnoses and all orders for this visit:    Atrial fibrillation, chronic (HCC)- rate on Toprol xl 50 mg, will lower the Eliquis to 2,5 bid   -     Stress Test With Myocardial Perfusion One Day; Future    Essential hypertension- The BP is 90.50. only on Toprol and diuretics, Torsemide 20 mg, will get lab     Hyperlipidemia LDL goal <100 On Crestor 40 mg    Chronic pulmonary embolism, unspecified pulmonary embolism type, unspecified whether acute cor pulmonale present (HCC)- On Eliquis 2.,5 bid   -     Basic Metabolic Panel    Shortness of breath- Multifactorial diastolic heart failure COPD and possible severe CAD, will get a lexiscan Nuclear   -     Basic Metabolic Panel  -     Stress Test With Myocardial Perfusion One Day; Future         No follow-ups on file.    Rufus Bradshaw MD  11/08/2021

## 2021-11-09 ENCOUNTER — LAB (OUTPATIENT)
Dept: CARDIOLOGY | Facility: CLINIC | Age: 86
End: 2021-11-09

## 2021-11-09 DIAGNOSIS — I48.20 ATRIAL FIBRILLATION, CHRONIC (HCC): Primary | ICD-10-CM

## 2021-11-10 ENCOUNTER — TELEPHONE (OUTPATIENT)
Dept: CARDIOLOGY | Facility: CLINIC | Age: 86
End: 2021-11-10

## 2021-11-10 LAB
BUN SERPL-MCNC: 27 MG/DL (ref 8–27)
BUN/CREAT SERPL: 31 (ref 12–28)
CALCIUM SERPL-MCNC: 9.2 MG/DL (ref 8.7–10.3)
CHLORIDE SERPL-SCNC: 96 MMOL/L (ref 96–106)
CO2 SERPL-SCNC: 22 MMOL/L (ref 20–29)
CREAT SERPL-MCNC: 0.87 MG/DL (ref 0.57–1)
GLUCOSE SERPL-MCNC: 83 MG/DL (ref 65–99)
POTASSIUM SERPL-SCNC: 4.4 MMOL/L (ref 3.5–5.2)
SODIUM SERPL-SCNC: 136 MMOL/L (ref 134–144)

## 2021-11-10 NOTE — TELEPHONE ENCOUNTER
Lab looks good creatinine is now normal 0.87     LEFT MESSAGE ON PHONE TO PLEASE CALL US BACK .

## 2021-11-15 ENCOUNTER — TELEPHONE (OUTPATIENT)
Dept: CARDIOLOGY | Facility: CLINIC | Age: 86
End: 2021-11-15

## 2021-11-15 NOTE — TELEPHONE ENCOUNTER
Called pt daughter and she thought her mom needed to take HALF tablet of the potassium and I reminded her that it was the eliquis she needed to take half of so from 5mg to 2.5mg a new script for 2.5mg was sent into pharmacy

## 2021-11-15 NOTE — TELEPHONE ENCOUNTER
please call daughter about the script for     potassium chloride (K-TAB) 20 MEQ tablet controlled-release ER tablet    This was never sent in    Because of her weight are they going to cut in half???        [phone]  Kristen       How many milligrams of eliqus is she suppose to take

## 2021-12-14 ENCOUNTER — TELEPHONE (OUTPATIENT)
Dept: CARDIOLOGY | Facility: CLINIC | Age: 86
End: 2021-12-14

## 2021-12-14 NOTE — TELEPHONE ENCOUNTER
----- Message from Carmen Thuanadore sent at 12/13/2021  2:51 PM EST -----  Regarding: Question regarding STRESS TEST WITH MYOCARDIAL PERFUSION ONE DAY  Curious what the numbers indicate.  Is there any information we need to know regarding these results?   Thank you,  Kristen Kinney [phone]

## 2021-12-27 RX ORDER — POTASSIUM CHLORIDE 1500 MG/1
TABLET, FILM COATED, EXTENDED RELEASE ORAL
Qty: 60 TABLET | Refills: 1 | Status: SHIPPED | OUTPATIENT
Start: 2021-12-27 | End: 2022-03-03

## 2022-02-23 ENCOUNTER — TELEPHONE (OUTPATIENT)
Dept: CARDIOLOGY | Facility: CLINIC | Age: 87
End: 2022-02-23

## 2022-02-23 DIAGNOSIS — I10 ESSENTIAL HYPERTENSION: ICD-10-CM

## 2022-02-23 DIAGNOSIS — I48.20 ATRIAL FIBRILLATION, CHRONIC: ICD-10-CM

## 2022-02-23 RX ORDER — METOPROLOL SUCCINATE 50 MG/1
50 TABLET, EXTENDED RELEASE ORAL DAILY
Qty: 90 TABLET | Refills: 1 | Status: SHIPPED | OUTPATIENT
Start: 2022-02-23 | End: 2022-08-26 | Stop reason: SDUPTHER

## 2022-02-23 NOTE — TELEPHONE ENCOUNTER
metoprolol succinate XL (TOPROL-XL) 50 MG 24 hr tablet      [facility] - [place], [place] - [address] AT [facility] [address] - [phone]  - [phone] FX        90 DAY

## 2022-03-03 RX ORDER — POTASSIUM CHLORIDE 1500 MG/1
TABLET, FILM COATED, EXTENDED RELEASE ORAL
Qty: 60 TABLET | Refills: 1 | Status: SHIPPED | OUTPATIENT
Start: 2022-03-03 | End: 2022-05-02

## 2022-03-24 RX ORDER — ROSUVASTATIN CALCIUM 20 MG/1
TABLET, COATED ORAL
Qty: 90 TABLET | Refills: 0 | Status: SHIPPED | OUTPATIENT
Start: 2022-03-24 | End: 2022-06-20

## 2022-04-27 RX ORDER — FOLIC ACID 1 MG/1
TABLET ORAL
Qty: 30 TABLET | Refills: 2 | Status: SHIPPED | OUTPATIENT
Start: 2022-04-27 | End: 2022-07-07

## 2022-05-02 RX ORDER — POTASSIUM CHLORIDE 1500 MG/1
TABLET, FILM COATED, EXTENDED RELEASE ORAL
Qty: 60 TABLET | Refills: 1 | Status: SHIPPED | OUTPATIENT
Start: 2022-05-02 | End: 2022-07-07

## 2022-05-06 DIAGNOSIS — R06.02 SHORTNESS OF BREATH: ICD-10-CM

## 2022-05-06 RX ORDER — TORSEMIDE 100 MG/1
TABLET ORAL
Qty: 30 TABLET | Refills: 0 | OUTPATIENT
Start: 2022-05-06

## 2022-05-12 DIAGNOSIS — R06.02 SHORTNESS OF BREATH: ICD-10-CM

## 2022-05-13 RX ORDER — TORSEMIDE 20 MG/1
TABLET ORAL
Qty: 90 TABLET | Refills: 1 | OUTPATIENT
Start: 2022-05-13

## 2022-05-20 ENCOUNTER — TELEPHONE (OUTPATIENT)
Dept: CARDIOLOGY | Facility: CLINIC | Age: 87
End: 2022-05-20

## 2022-05-20 DIAGNOSIS — R06.02 SHORTNESS OF BREATH: ICD-10-CM

## 2022-05-20 RX ORDER — TORSEMIDE 20 MG/1
20 TABLET ORAL DAILY
Qty: 90 TABLET | Refills: 1 | Status: SHIPPED | OUTPATIENT
Start: 2022-05-20 | End: 2022-08-26 | Stop reason: SDUPTHER

## 2022-05-20 NOTE — TELEPHONE ENCOUNTER
torsemide (DEMADEX) 20 MG tablet      [facility] - [place], [place] - [address] AT [facility] [address] - [phone]  - [phone] FX

## 2022-06-20 RX ORDER — ROSUVASTATIN CALCIUM 20 MG/1
TABLET, COATED ORAL
Qty: 90 TABLET | Refills: 0 | Status: SHIPPED | OUTPATIENT
Start: 2022-06-20 | End: 2022-07-07

## 2022-07-07 RX ORDER — FOLIC ACID 1 MG/1
TABLET ORAL
Qty: 30 TABLET | Refills: 2 | Status: SHIPPED | OUTPATIENT
Start: 2022-07-07 | End: 2022-11-12 | Stop reason: SDUPTHER

## 2022-07-07 RX ORDER — ROSUVASTATIN CALCIUM 20 MG/1
TABLET, COATED ORAL
Qty: 90 TABLET | Refills: 0 | Status: SHIPPED | OUTPATIENT
Start: 2022-07-07 | End: 2022-08-26 | Stop reason: SDUPTHER

## 2022-07-07 RX ORDER — POTASSIUM CHLORIDE 1500 MG/1
TABLET, FILM COATED, EXTENDED RELEASE ORAL
Qty: 60 TABLET | Refills: 0 | Status: SHIPPED | OUTPATIENT
Start: 2022-07-07 | End: 2022-08-10

## 2022-08-10 RX ORDER — POTASSIUM CHLORIDE 1500 MG/1
TABLET, FILM COATED, EXTENDED RELEASE ORAL
Qty: 20 TABLET | Refills: 0 | Status: SHIPPED | OUTPATIENT
Start: 2022-08-10 | End: 2022-08-26 | Stop reason: SDUPTHER

## 2022-08-26 ENCOUNTER — OFFICE VISIT (OUTPATIENT)
Dept: CARDIOLOGY | Facility: CLINIC | Age: 87
End: 2022-08-26

## 2022-08-26 ENCOUNTER — TELEPHONE (OUTPATIENT)
Dept: CARDIOLOGY | Facility: CLINIC | Age: 87
End: 2022-08-26

## 2022-08-26 VITALS
HEIGHT: 65 IN | SYSTOLIC BLOOD PRESSURE: 122 MMHG | HEART RATE: 68 BPM | DIASTOLIC BLOOD PRESSURE: 60 MMHG | BODY MASS INDEX: 17.66 KG/M2 | WEIGHT: 106 LBS | OXYGEN SATURATION: 97 %

## 2022-08-26 DIAGNOSIS — I27.82 CHRONIC PULMONARY EMBOLISM, UNSPECIFIED PULMONARY EMBOLISM TYPE, UNSPECIFIED WHETHER ACUTE COR PULMONALE PRESENT: ICD-10-CM

## 2022-08-26 DIAGNOSIS — R06.02 SHORTNESS OF BREATH: ICD-10-CM

## 2022-08-26 DIAGNOSIS — I10 ESSENTIAL HYPERTENSION: ICD-10-CM

## 2022-08-26 DIAGNOSIS — J43.2 CENTRILOBULAR EMPHYSEMA: ICD-10-CM

## 2022-08-26 DIAGNOSIS — I48.20 ATRIAL FIBRILLATION, CHRONIC: ICD-10-CM

## 2022-08-26 DIAGNOSIS — I50.32 CHRONIC DIASTOLIC HEART FAILURE: ICD-10-CM

## 2022-08-26 DIAGNOSIS — E78.5 HYPERLIPIDEMIA LDL GOAL <100: Primary | ICD-10-CM

## 2022-08-26 PROCEDURE — 93000 ELECTROCARDIOGRAM COMPLETE: CPT | Performed by: INTERNAL MEDICINE

## 2022-08-26 PROCEDURE — 99214 OFFICE O/P EST MOD 30 MIN: CPT | Performed by: INTERNAL MEDICINE

## 2022-08-26 RX ORDER — TORSEMIDE 20 MG/1
20 TABLET ORAL DAILY
Qty: 90 TABLET | Refills: 1 | Status: SHIPPED | OUTPATIENT
Start: 2022-08-26 | End: 2022-08-26 | Stop reason: SDUPTHER

## 2022-08-26 RX ORDER — TORSEMIDE 10 MG/1
20 TABLET ORAL DAILY
Qty: 90 TABLET | Refills: 3 | Status: SHIPPED | OUTPATIENT
Start: 2022-08-26 | End: 2023-03-13 | Stop reason: SDUPTHER

## 2022-08-26 RX ORDER — POTASSIUM CHLORIDE 1500 MG/1
40 TABLET, FILM COATED, EXTENDED RELEASE ORAL DAILY
Qty: 180 TABLET | Refills: 3 | Status: SHIPPED | OUTPATIENT
Start: 2022-08-26

## 2022-08-26 RX ORDER — ROSUVASTATIN CALCIUM 20 MG/1
20 TABLET, COATED ORAL DAILY
Qty: 90 TABLET | Refills: 3 | Status: SHIPPED | OUTPATIENT
Start: 2022-08-26

## 2022-08-26 RX ORDER — METOPROLOL SUCCINATE 50 MG/1
50 TABLET, EXTENDED RELEASE ORAL DAILY
Qty: 90 TABLET | Refills: 3 | Status: SHIPPED | OUTPATIENT
Start: 2022-08-26

## 2022-08-26 NOTE — TELEPHONE ENCOUNTER
Please tell pt Daughter Dr. Bradshaw did send in the 10 mg of torsemide . They were probably given an old prescription.

## 2022-08-26 NOTE — PROGRESS NOTES
[facility]  [facility]  [address] [place] [address]  Dept: [phone]  Dept Fax: [phone]    Carol Janeway  1935    Follow Up Office Visit Note    History of Present Illness:  Carol Janeway is a 87 y.o. female who presents to the clinic for Follow-up. Diastolic heart failure- She  Seems doing well no edema, has stop taking the water pill 1 week ago and no edema, will lower the Torsemide to 10 mg, she seems doing well    The following portions of the patient's history were reviewed and updated as appropriate: allergies, current medications, past family history, past medical history, past social history, past surgical history and problem list.    Medications:  amantadine  apixaban tablet  budesonide-formoterol  dicyclomine  folic acid  melatonin tablet  metoprolol succinate XL  nicotine  potassium chloride ER tablet controlled-release  rosuvastatin  tiotropium bromide monohydrate aerosol solution  torsemide    Subjective  Allergies   Allergen Reactions   • Sulfamethoxazole Hives   • Trimethoprim Nausea And Vomiting        Past Medical History:   Diagnosis Date   • Acute pulmonary embolism without acute cor pulmonale (HCC)    • Alcohol use    • At high risk for falls    • Atherosclerosis of native coronary artery of native heart without angina pectoris    • Benign hypertension    • CAD (coronary artery disease)    • Chronic atrial fibrillation (HCC)    • Chronic obstructive lung disease (HCC)    • COPD with chronic bronchitis (HCC)    • Coronary arteriosclerosis in native artery    • High risk medication use    • History of maternal deep vein thrombosis (DVT)    • Hyperlipidemia    • MVP (mitral valve prolapse)     NOT SEEN ON RECENT ECHO   • Osteoporosis    • Pulmonary embolism (HCC)    • Pulmonary thromboembolism (HCC)    • Pure hypercholesterolemia    • Thrombocytopenia (HCC)    • Tobacco dependence syndrome    • Tobacco user        Past Surgical History:  "  Procedure Laterality Date   • MOHS SURGERY      X2       Family History   Problem Relation Age of Onset   • Stroke Father    • Hypertension Father    • Diabetes Other         Social History     Socioeconomic History   • Marital status:    Tobacco Use   • Smoking status: Former Smoker     Packs/day: 1.00     Types: Cigarettes     Quit date:      Years since quittin.6   • Smokeless tobacco: Never Used   Vaping Use   • Vaping Use: Never used   Substance and Sexual Activity   • Alcohol use: Never   • Drug use: Never   • Sexual activity: Defer       Review of Systems   Constitutional: Negative.    HENT: Negative.    Respiratory: Positive for shortness of breath.    Cardiovascular: Negative.    Endocrine: Negative.    Genitourinary: Negative.    Musculoskeletal: Negative.    Skin: Negative.    Allergic/Immunologic: Negative.    Neurological: Negative.    Hematological: Negative.    Psychiatric/Behavioral: Negative.        Cardiovascular Procedures    ECHO/MUGA:   STRESS TESTS:   CARDIAC CATH:   DEVICES:   HOLTER:   CT/MRI:   VASCULAR:   CARDIOTHORACIC:     Objective  Vitals:    22 1323   BP: 122/60   Pulse: 68   SpO2: 97%   Weight: 48.1 kg (106 lb)   Height: 165.1 cm (65\")   PainSc: 0-No pain     Body mass index is 17.64 kg/m².     Physical Exam  Vitals reviewed.   Constitutional:       Appearance: Healthy appearance. Not in distress.   Neck:      Vascular: No JVR. JVD normal.   Pulmonary:      Effort: Pulmonary effort is normal.      Breath sounds: Normal breath sounds. No wheezing. No rhonchi. No rales.   Chest:      Chest wall: Not tender to palpatation.   Cardiovascular:      PMI at left midclavicular line. Normal rate. Irregularly irregular rhythm. Normal S1. Normal S2.      Murmurs: There is no murmur.      No gallop. No click. No rub.   Pulses:     Intact distal pulses.   Edema:     Peripheral edema absent.   Abdominal:      General: Bowel sounds are normal.      Palpations: Abdomen is soft. "      Tenderness: There is no abdominal tenderness.   Musculoskeletal: Normal range of motion.         General: No tenderness. Skin:     General: Skin is warm and dry.   Neurological:      General: No focal deficit present.      Mental Status: Alert and oriented to person, place and time.          Diagnostic Data    ECG 12 Lead    Date/Time: 8/26/2022 3:09 PM  Performed by: Rufus Bradshaw MD  Authorized by: Rufus Bradshaw MD   Comparison: compared with previous ECG from 8/30/2021  Similar to previous ECG  Rhythm: atrial fibrillation  Rate: normal  BPM: 57    Clinical impression: abnormal EKG            Assessment and Plan  Diagnoses and all orders for this visit:    Hyperlipidemia LDL goal <100- On Crestor 40 mg tolerates well the meds      Essential hypertension- Bp is 110.60, only on Torsemide 10 mg now  -     metoprolol succinate XL (TOPROL-XL) 50 MG 24 hr tablet; Take 1 tablet by mouth Daily.    Atrial fibrillation, chronic (HCC)= rate control on Toprol xl 50 mg and also Eliquis  2,5 bid  -     metoprolol succinate XL (TOPROL-XL) 50 MG 24 hr tablet; Take 1 tablet by mouth Daily.    Chronic pulmonary embolism, unspecified pulmonary embolism type, unspecified whether acute cor pulmonale present (HCC)- on Eliquis 25 bid     Centrilobular emphysema (HCC)    Chronic diastolic heart failure (HCC)- doing fine, no edema, was taking 20 mg until 1 week ago, no edema, will lower the Torsemide to 10 mg    Other orders  -     potassium chloride ER (K-TAB) 20 MEQ tablet controlled-release ER tablet; Take 2 tablets by mouth Daily.  -     apixaban (ELIQUIS) 2.5 MG tablet tablet; Take 1 tablet by mouth Every 12 (Twelve) Hours.  -     rosuvastatin (CRESTOR) 20 MG tablet; Take 1 tablet by mouth Daily.         Return in about 6 months (around 2/26/2023) for Recheck.    Rufus Bradshaw MD  08/26/2022

## 2022-08-26 NOTE — TELEPHONE ENCOUNTER
Pt's daughter picked up Torsemide that was supposed to have been changed from 20 mg to 10 mg at today's visit. Leeann did not change the dosage and it was written for 20.  The pt has been out of this medication for some time and was due to take meds today. Said they will split one for today but would like a call back ASAP.

## 2022-08-27 LAB
ALBUMIN SERPL-MCNC: 4.2 G/DL (ref 3.6–4.6)
ALBUMIN/GLOB SERPL: 1.8 {RATIO} (ref 1.2–2.2)
ALP SERPL-CCNC: 113 IU/L (ref 44–121)
ALT SERPL-CCNC: 25 IU/L (ref 0–32)
AST SERPL-CCNC: 36 IU/L (ref 0–40)
BASOPHILS # BLD AUTO: 0 X10E3/UL (ref 0–0.2)
BASOPHILS NFR BLD AUTO: 0 %
BILIRUB SERPL-MCNC: 0.5 MG/DL (ref 0–1.2)
BUN SERPL-MCNC: 20 MG/DL (ref 8–27)
BUN/CREAT SERPL: 23 (ref 12–28)
CALCIUM SERPL-MCNC: 9.2 MG/DL (ref 8.7–10.3)
CHLORIDE SERPL-SCNC: 106 MMOL/L (ref 96–106)
CO2 SERPL-SCNC: 21 MMOL/L (ref 20–29)
CREAT SERPL-MCNC: 0.86 MG/DL (ref 0.57–1)
EGFRCR-CYS SERPLBLD CKD-EPI 2021: 66 ML/MIN/1.73
EOSINOPHIL # BLD AUTO: 0.1 X10E3/UL (ref 0–0.4)
EOSINOPHIL NFR BLD AUTO: 1 %
ERYTHROCYTE [DISTWIDTH] IN BLOOD BY AUTOMATED COUNT: 14.2 % (ref 11.7–15.4)
GLOBULIN SER CALC-MCNC: 2.4 G/DL (ref 1.5–4.5)
GLUCOSE SERPL-MCNC: 85 MG/DL (ref 65–99)
HCT VFR BLD AUTO: 32.3 % (ref 34–46.6)
HGB BLD-MCNC: 10.5 G/DL (ref 11.1–15.9)
IMM GRANULOCYTES # BLD AUTO: 0 X10E3/UL (ref 0–0.1)
IMM GRANULOCYTES NFR BLD AUTO: 0 %
LYMPHOCYTES # BLD AUTO: 1.3 X10E3/UL (ref 0.7–3.1)
LYMPHOCYTES NFR BLD AUTO: 13 %
MCH RBC QN AUTO: 32 PG (ref 26.6–33)
MCHC RBC AUTO-ENTMCNC: 32.5 G/DL (ref 31.5–35.7)
MCV RBC AUTO: 99 FL (ref 79–97)
MONOCYTES # BLD AUTO: 0.9 X10E3/UL (ref 0.1–0.9)
MONOCYTES NFR BLD AUTO: 9 %
NEUTROPHILS # BLD AUTO: 7.8 X10E3/UL (ref 1.4–7)
NEUTROPHILS NFR BLD AUTO: 77 %
NT-PROBNP SERPL-MCNC: 5010 PG/ML (ref 0–738)
PLATELET # BLD AUTO: 143 X10E3/UL (ref 150–450)
POTASSIUM SERPL-SCNC: 5.5 MMOL/L (ref 3.5–5.2)
PROT SERPL-MCNC: 6.6 G/DL (ref 6–8.5)
RBC # BLD AUTO: 3.28 X10E6/UL (ref 3.77–5.28)
SODIUM SERPL-SCNC: 141 MMOL/L (ref 134–144)
WBC # BLD AUTO: 10.1 X10E3/UL (ref 3.4–10.8)

## 2022-08-29 ENCOUNTER — TELEPHONE (OUTPATIENT)
Dept: CARDIOLOGY | Facility: CLINIC | Age: 87
End: 2022-08-29

## 2022-08-29 NOTE — TELEPHONE ENCOUNTER
----- Message from Rufus Bradshaw MD sent at 8/27/2022  9:02 PM EDT -----  She is anemic, see if you can add iron , sat transferrin, ferritin,Vit B12, - she will need after to discuss with PCP, her BNP is high, very high but she does not have complaints, her kidney function is normal, but Potassium is high, she should d.c the potassium, and we told her the day of the visit to lower Torsemide to 10 mg but if she develops SOB or edema, go back to 20

## 2022-08-29 NOTE — TELEPHONE ENCOUNTER
Left a message for Ms. Janeway to call and speak with Rhea or Osiris to go over recent lab results and medication changes.  Thank you.

## 2022-08-29 NOTE — TELEPHONE ENCOUNTER
Left a message with Ms. Janeway and Kristen Kinney to call back tomorrow and we would be happy to go over recent lab results and medication changes.  Thank you.

## 2022-08-30 ENCOUNTER — TELEPHONE (OUTPATIENT)
Dept: CARDIOLOGY | Facility: CLINIC | Age: 87
End: 2022-08-30

## 2022-08-30 LAB
FERRITIN SERPL-MCNC: 205 NG/ML (ref 15–150)
IRON SATN MFR SERPL: 20 % (ref 15–55)
IRON SERPL-MCNC: 69 UG/DL (ref 27–139)
TIBC SERPL-MCNC: 340 UG/DL (ref 250–450)
TRANSFERRIN SERPL-MCNC: 296 MG/DL (ref 149–313)
UIBC SERPL-MCNC: 271 UG/DL (ref 118–369)
VIT B12 SERPL-MCNC: 460 PG/ML (ref 232–1245)
WRITTEN AUTHORIZATION: NORMAL

## 2022-08-30 NOTE — TELEPHONE ENCOUNTER
---- Message from Rufus Bradshaw MD sent at 8/27/2022  9:02 PM EDT -----  She is anemic, see if you can add iron , sat transferrin, ferritin,Vit B12, - she will need after to discuss with PCP, her BNP is high, very high but she does not have complaints, her kidney function is normal, but Potassium is high, she should d.c the potassium, and we told her the day of the visit to lower Torsemide to 10 mg but if she develops SOB or edema, go back to 20    SPOKE WITH PT DAUGHTER AND SHE UNDERSTOOD MESSAGE.

## 2022-09-13 RX ORDER — DICYCLOMINE HCL 20 MG
TABLET ORAL
Qty: 90 TABLET | Refills: 0 | Status: SHIPPED | OUTPATIENT
Start: 2022-09-13 | End: 2022-12-29 | Stop reason: SDUPTHER

## 2022-10-25 RX ORDER — FOLIC ACID 1 MG/1
1000 TABLET ORAL DAILY
Qty: 30 TABLET | Refills: 2 | OUTPATIENT
Start: 2022-10-25

## 2022-10-25 NOTE — TELEPHONE ENCOUNTER
Caller: JIMMY KEEN    Relationship: Emergency Contact    Best call back number: [phone]    Requested Prescriptions:   Requested Prescriptions     Pending Prescriptions Disp Refills   • folic acid (FOLVITE) 1 MG tablet 30 tablet 2     Sig: Take 1 tablet by mouth Daily.        Pharmacy where request should be sent: [id number] - [place], [place] - [address] [place] [address] AT [facility] [address] - [phone]  - [phone] FX     Additional details provided by patient: [facility] HAS BEEN OUT FOR A WEEK.    Does the patient have less than a 3 day supply:  [x] Yes  [] No    Yuval Celis Rep   10/25/22 13:06 EDT

## 2022-11-12 RX ORDER — FOLIC ACID 1 MG/1
1000 TABLET ORAL DAILY
Qty: 30 TABLET | Refills: 2 | Status: SHIPPED | OUTPATIENT
Start: 2022-11-12

## 2022-12-02 ENCOUNTER — OFFICE VISIT (OUTPATIENT)
Dept: FAMILY MEDICINE CLINIC | Facility: CLINIC | Age: 87
End: 2022-12-02

## 2022-12-02 VITALS
BODY MASS INDEX: 17.41 KG/M2 | OXYGEN SATURATION: 94 % | SYSTOLIC BLOOD PRESSURE: 115 MMHG | HEIGHT: 65 IN | HEART RATE: 100 BPM | WEIGHT: 104.5 LBS | DIASTOLIC BLOOD PRESSURE: 70 MMHG

## 2022-12-02 DIAGNOSIS — J44.9 COPD (CHRONIC OBSTRUCTIVE PULMONARY DISEASE) WITH CHRONIC BRONCHITIS: ICD-10-CM

## 2022-12-02 DIAGNOSIS — Z00.00 ENCOUNTER FOR SUBSEQUENT ANNUAL WELLNESS VISIT (AWV) IN MEDICARE PATIENT: Primary | ICD-10-CM

## 2022-12-02 DIAGNOSIS — I48.11 LONGSTANDING PERSISTENT ATRIAL FIBRILLATION: ICD-10-CM

## 2022-12-02 DIAGNOSIS — D50.0 IRON DEFICIENCY ANEMIA DUE TO CHRONIC BLOOD LOSS: ICD-10-CM

## 2022-12-02 PROCEDURE — G0439 PPPS, SUBSEQ VISIT: HCPCS | Performed by: FAMILY MEDICINE

## 2022-12-02 PROCEDURE — 96160 PT-FOCUSED HLTH RISK ASSMT: CPT | Performed by: FAMILY MEDICINE

## 2022-12-02 PROCEDURE — G0008 ADMIN INFLUENZA VIRUS VAC: HCPCS | Performed by: FAMILY MEDICINE

## 2022-12-02 PROCEDURE — 1159F MED LIST DOCD IN RCRD: CPT | Performed by: FAMILY MEDICINE

## 2022-12-02 PROCEDURE — 99213 OFFICE O/P EST LOW 20 MIN: CPT | Performed by: FAMILY MEDICINE

## 2022-12-02 PROCEDURE — 1170F FXNL STATUS ASSESSED: CPT | Performed by: FAMILY MEDICINE

## 2022-12-02 PROCEDURE — 90662 IIV NO PRSV INCREASED AG IM: CPT | Performed by: FAMILY MEDICINE

## 2022-12-02 NOTE — PROGRESS NOTES
The ABCs of the Annual Wellness Visit  Subsequent Medicare Wellness Visit    Chief Complaint   Patient presents with   • Medicare Wellness-subsequent      Subjective    History of Present Illness:  Carol Janeway is a 87 y.o. female who presents for a Subsequent Medicare Wellness Visit.  Patient states she has been fatigued she needs to know why she had blood work done via cardiology in August she was noted to be anemic down to 12 hemoglobin dropped from 12-8 she says that she has not followed up with that and not taking vitamins or iron presently they thought it may be possibly related to her Eliquis but unsure she is here today to get that worked up as well    The following portions of the patient's history were reviewed and   updated as appropriate: past social history and problem list.    Compared to one year ago, the patient feels her physical   health is the same.    Compared to one year ago, the patient feels her mental   health is the same.    Recent Hospitalizations:  She was not admitted to the hospital during the last year.       Current Medical Providers:  Patient Care Team:  Americo Hart MD as PCP - General (Family Medicine)    Outpatient Medications Prior to Visit   Medication Sig Dispense Refill   • apixaban (ELIQUIS) 2.5 MG tablet tablet Take 1 tablet by mouth Every 12 (Twelve) Hours. 180 tablet 3   • budesonide-formoterol (SYMBICORT) 160-4.5 MCG/ACT inhaler Inhale 1 puff 2 (Two) Times a Day.     • dicyclomine (BENTYL) 20 MG tablet TAKE ONE TABLET BY MOUTH EVERY 8 HOURS 90 tablet 0   • folic acid (FOLVITE) 1 MG tablet Take 1 tablet by mouth Daily. 30 tablet 2   • melatonin 5 MG tablet tablet Take 5 mg by mouth Every Night.     • metoprolol succinate XL (TOPROL-XL) 50 MG 24 hr tablet Take 1 tablet by mouth Daily. 90 tablet 3   • potassium chloride ER (K-TAB) 20 MEQ tablet controlled-release ER tablet Take 2 tablets by mouth Daily. 180 tablet 3   • rosuvastatin (CRESTOR) 20 MG tablet Take 1 tablet by  "mouth Daily. 90 tablet 3   • tiotropium bromide monohydrate (SPIRIVA RESPIMAT) 2.5 MCG/ACT aerosol solution inhaler Inhale 1 puff Daily.     • torsemide (DEMADEX) 10 MG tablet Take 2 tablets by mouth Daily. 90 tablet 3   • amantadine (SYMMETREL) 50 MG/5ML solution Take 50 mg by mouth Every 12 (Twelve) Hours. TAKE 10 MILLILITER BY ORAL ROUTE 2 TIMES DAILY     • nicotine (NICODERM CQ) 7 MG/24HR patch Place 1 patch on the skin as directed by provider Daily.       No facility-administered medications prior to visit.       No opioid medication identified on active medication list. I have reviewed chart for other potential  high risk medication/s and harmful drug interactions in the elderly.          Aspirin is not on active medication list.  Aspirin use is contraindicated for this patient due to: current use of Eliquis.  .    Patient Active Problem List   Diagnosis   • Atrial fibrillation, chronic (HCC)   • Essential hypertension   • Hyperlipidemia LDL goal <100   • Chronic pulmonary embolism (HCC)   • Centrilobular emphysema (HCC)   • Chronic diastolic heart failure (HCC)     Advance Care Planning  Advance Directive is not on file.  ACP discussion was held with the patient during this visit. Patient has an advance directive (not in EMR), copy requested.    Review of Systems   Constitutional: Positive for fatigue.   HENT: Negative.    Eyes: Negative.    Respiratory: Negative.    Cardiovascular: Negative.    Gastrointestinal: Negative.    Endocrine: Negative.    Genitourinary: Negative.    Musculoskeletal: Negative.    Skin: Negative.    Allergic/Immunologic: Negative.    Neurological: Negative.    Hematological: Negative.    Psychiatric/Behavioral: Negative.         Objective    Vitals:    12/02/22 1410   BP: 115/70   Pulse: 100   SpO2: 94%   Weight: 47.4 kg (104 lb 8 oz)   Height: 165.1 cm (65\")     Estimated body mass index is 17.39 kg/m² as calculated from the following:    Height as of this encounter: 165.1 cm " "(65\").    Weight as of this encounter: 47.4 kg (104 lb 8 oz).    BMI is below normal parameters (malnutrition). Recommendations: none (medical contraindication)      Does the patient have evidence of cognitive impairment? Yes    Physical Exam  Vitals reviewed.   Constitutional:       Appearance: Normal appearance.   HENT:      Head: Normocephalic and atraumatic.      Right Ear: Tympanic membrane, ear canal and external ear normal.      Left Ear: Tympanic membrane, ear canal and external ear normal.      Nose: Nose normal.      Mouth/Throat:      Mouth: Mucous membranes are moist.   Eyes:      Extraocular Movements: Extraocular movements intact.      Conjunctiva/sclera: Conjunctivae normal.      Pupils: Pupils are equal, round, and reactive to light.   Cardiovascular:      Rate and Rhythm: Normal rate. Rhythm irregular.   Pulmonary:      Comments: Decreased breath sound  Abdominal:      General: Abdomen is flat. Bowel sounds are normal.      Palpations: Abdomen is soft.   Musculoskeletal:         General: Normal range of motion.   Feet:      Right foot:      Protective Sensation: 0 sites tested. 0 sites sensed.      Toenail Condition: Right toenails are normal.      Left foot:      Protective Sensation: 0 sites tested. 0 sites sensed.      Toenail Condition: Left toenails are normal.   Skin:     General: Skin is warm and dry.   Neurological:      General: No focal deficit present.      Mental Status: She is alert and oriented to person, place, and time. Mental status is at baseline.   Psychiatric:         Behavior: Behavior normal.         Thought Content: Thought content normal.         Judgment: Judgment normal.                 HEALTH RISK ASSESSMENT    Smoking Status:  Social History     Tobacco Use   Smoking Status Former   • Packs/day: 1.00   • Types: Cigarettes   • Quit date:    • Years since quittin.9   Smokeless Tobacco Never     Alcohol Consumption:  Social History     Substance and Sexual Activity "   Alcohol Use Never     Fall Risk Screen:    KEERTHI Fall Risk Assessment has not been completed.    Depression Screening:  PHQ-2/PHQ-9 Depression Screening 12/2/2022   Little Interest or Pleasure in Doing Things 0-->not at all   Feeling Down, Depressed or Hopeless 0-->not at all   PHQ-9: Brief Depression Severity Measure Score 0       Health Habits and Functional and Cognitive Screening:  Functional & Cognitive Status 12/2/2022   Do you have difficulty preparing food and eating? No   Do you have difficulty bathing yourself, getting dressed or grooming yourself? No   Do you have difficulty using the toilet? No   Do you have difficulty moving around from place to place? No   Do you have trouble with steps or getting out of a bed or a chair? No   Dental Exam Up to date   Eye Exam Not up to date   Exercise (times per week) 0 times per week   Current Exercises Include No Regular Exercise   Do you need help using the phone?  No   Are you deaf or do you have serious difficulty hearing?  No   Do you need help with transportation? No   Do you need help shopping? (No Data)   Do you need help preparing meals?  No   Do you need help with housework?  No   Do you need help with laundry? No   Do you need help taking your medications? No   Do you need help managing money? No   Do you ever drive or ride in a car without wearing a seat belt? No   Have you felt unusual stress, anger or loneliness in the last month? No   Who do you live with? Alone   If you need help, do you have trouble finding someone available to you? Yes   Have you been bothered in the last four weeks by sexual problems? No   Do you have difficulty concentrating, remembering or making decisions? Yes       Age-appropriate Screening Schedule:  Refer to the list below for future screening recommendations based on patient's age, sex and/or medical conditions. Orders for these recommended tests are listed in the plan section. The patient has been provided with a written  plan.    Health Maintenance   Topic Date Due   • DXA SCAN  Never done   • TDAP/TD VACCINES (1 - Tdap) Never done   • ZOSTER VACCINE (1 of 2) Never done   • LIPID PANEL  03/16/2022   • INFLUENZA VACCINE  Completed              Assessment & Plan   CMS Preventative Services Quick Reference  Risk Factors Identified During Encounter  Cardiovascular Disease  The above risks/problems have been discussed with the patient.  Follow up actions/plans if indicated are seen below in the Assessment/Plan Section.  Pertinent information has been shared with the patient in the After Visit Summary.    Diagnoses and all orders for this visit:    1. Encounter for subsequent annual wellness visit (AWV) in Medicare patient (Primary)  Comments:  Monitor continue medications following cardiology    2. Iron deficiency anemia due to chronic blood loss  Comments:  Repeat Jose blood work and monitor she been on Eliquis has not had her anemia followed up on her work-up recently  Orders:  -     CBC & Differential; Future  -     Ferritin; Future  -     Iron; Future    3. 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 Longstanding persistent atrial fibrillation (HCC)  Comments:  Continues on meds we will work-up anemia and see suspect Eliquis contributing  Orders:  -     Comprehensive Metabolic Panel; Future    Other orders  -     Fluzone High-Dose 65+yrs ([id number])        Follow Up:   No follow-ups on file.     An After Visit Summary and PPPS were made available to the patient.

## 2022-12-22 ENCOUNTER — TELEPHONE (OUTPATIENT)
Dept: FAMILY MEDICINE CLINIC | Facility: CLINIC | Age: 87
End: 2022-12-22

## 2022-12-22 DIAGNOSIS — R06.02 SHORTNESS OF BREATH: ICD-10-CM

## 2022-12-22 NOTE — TELEPHONE ENCOUNTER
Caller: SOFI BONNER    Relationship: DAUGHTER     Best call back number:   CALL BACK [phone]  Requested Prescriptions:    Requested Prescriptions     Pending Prescriptions Disp Refills   • torsemide (DEMADEX) 10 MG tablet 90 tablet 3     Sig: Take 2 tablets by mouth Daily.   • dicyclomine (BENTYL) 20 MG tablet 90 tablet 0     Sig: Take 1 tablet by mouth Every 8 (Eight) Hours.      Pharmacy where request should be sent:  [facility]     Additional details provided by patient:  SOFI  SAID THE TORSEMIDE MEDICATION IS 10 MG TAKE 1 TABLET BY MOUTH DAILY NOT 2 TABLETS DAILY  PLEASE ADVISE  AND CLARIFY     Does the patient have less than a 3 day supply:  [x] Yes  [] No    Would you like a call back once the refill request has been completed: [x] Yes [] No    If the office needs to give you a call back, can they leave a voicemail: [x] Yes [] No

## 2022-12-29 ENCOUNTER — TELEPHONE (OUTPATIENT)
Dept: FAMILY MEDICINE CLINIC | Facility: CLINIC | Age: 87
End: 2022-12-29

## 2022-12-29 RX ORDER — DICYCLOMINE HCL 20 MG
20 TABLET ORAL EVERY 8 HOURS
Qty: 90 TABLET | Refills: 0 | Status: SHIPPED | OUTPATIENT
Start: 2022-12-29

## 2022-12-29 NOTE — TELEPHONE ENCOUNTER
KATHIE PATIENT   pt daughter called and pt is totally our of her med and needs a refill sent in please. dicyclomine (BENTYL) 20 MG tablet

## 2023-03-13 ENCOUNTER — OFFICE VISIT (OUTPATIENT)
Dept: CARDIOLOGY | Facility: CLINIC | Age: 88
End: 2023-03-13
Payer: MEDICARE

## 2023-03-13 VITALS
RESPIRATION RATE: 18 BRPM | SYSTOLIC BLOOD PRESSURE: 102 MMHG | HEIGHT: 65 IN | HEART RATE: 87 BPM | TEMPERATURE: 97.1 F | OXYGEN SATURATION: 97 % | DIASTOLIC BLOOD PRESSURE: 78 MMHG | BODY MASS INDEX: 17.16 KG/M2 | WEIGHT: 103 LBS

## 2023-03-13 DIAGNOSIS — R06.02 SHORTNESS OF BREATH: ICD-10-CM

## 2023-03-13 DIAGNOSIS — I10 ESSENTIAL HYPERTENSION: ICD-10-CM

## 2023-03-13 DIAGNOSIS — I27.82 CHRONIC PULMONARY EMBOLISM, UNSPECIFIED PULMONARY EMBOLISM TYPE, UNSPECIFIED WHETHER ACUTE COR PULMONALE PRESENT: ICD-10-CM

## 2023-03-13 DIAGNOSIS — J43.2 CENTRILOBULAR EMPHYSEMA: ICD-10-CM

## 2023-03-13 DIAGNOSIS — I48.20 ATRIAL FIBRILLATION, CHRONIC: Primary | ICD-10-CM

## 2023-03-13 DIAGNOSIS — E78.5 HYPERLIPIDEMIA LDL GOAL <100: ICD-10-CM

## 2023-03-13 DIAGNOSIS — I50.32 CHRONIC DIASTOLIC HEART FAILURE: ICD-10-CM

## 2023-03-13 PROCEDURE — 1160F RVW MEDS BY RX/DR IN RCRD: CPT | Performed by: INTERNAL MEDICINE

## 2023-03-13 PROCEDURE — 99214 OFFICE O/P EST MOD 30 MIN: CPT | Performed by: INTERNAL MEDICINE

## 2023-03-13 PROCEDURE — 1159F MED LIST DOCD IN RCRD: CPT | Performed by: INTERNAL MEDICINE

## 2023-03-13 RX ORDER — TORSEMIDE 10 MG/1
10 TABLET ORAL DAILY
Qty: 90 TABLET | Refills: 1 | Status: SHIPPED | OUTPATIENT
Start: 2023-03-13

## 2023-03-13 NOTE — PROGRESS NOTES
[facility]  [facility] CARDIOLOGY  [address] [place] [address]  Dept: [phone]  Dept Fax: [phone]    Carol Janeway  1935    Follow Up Office Visit Note    History of Present Illness:  Carol Janeway is a 88 y.o. female who presents to the clinic for Follow-up. Diastolic heart failure- She seems steady, some SOB on exertion, no edema taking Torsemide 10 mg daily,     The following portions of the patient's history were reviewed and updated as appropriate: allergies, current medications, past family history, past medical history, past social history, past surgical history, and problem list.    Medications:  apixaban tablet  dicyclomine  folic acid  melatonin tablet  metoprolol succinate XL  potassium chloride ER tablet controlled-release  rosuvastatin  torsemide    Subjective  Allergies   Allergen Reactions   • Sulfamethoxazole Hives   • Trimethoprim Nausea And Vomiting   • Toprol Xl [Metoprolol] Unknown - High Severity        Past Medical History:   Diagnosis Date   • Acute pulmonary embolism without acute cor pulmonale (HCC)    • Alcohol use    • At high risk for falls    • Atherosclerosis of native coronary artery of native heart without angina pectoris    • Benign hypertension    • CAD (coronary artery disease)    • Chronic atrial fibrillation (HCC)    • Chronic obstructive lung disease (HCC)    • COPD with chronic bronchitis (HCC)    • Coronary arteriosclerosis in native artery    • High risk medication use    • History of maternal deep vein thrombosis (DVT)    • Hyperlipidemia    • MVP (mitral valve prolapse)     NOT SEEN ON RECENT ECHO   • Osteoporosis    • Pulmonary embolism (HCC)    • Pulmonary thromboembolism (HCC)    • Pure hypercholesterolemia    • Thrombocytopenia (HCC)    • Tobacco dependence syndrome    • Tobacco user        Past Surgical History:   Procedure Laterality Date   • MOHS SURGERY      X2       Family History   Problem Relation Age of Onset  "  • Stroke Father    • Hypertension Father    • Diabetes Other         Social History     Socioeconomic History   • Marital status:    Tobacco Use   • Smoking status: Former     Packs/day: 1.00     Types: Cigarettes     Quit date: 2020     Years since quitting: 3.1   • Smokeless tobacco: Never   Vaping Use   • Vaping Use: Never used   Substance and Sexual Activity   • Alcohol use: Never   • Drug use: Never   • Sexual activity: Defer       Review of Systems   Constitutional: Negative.    HENT: Negative.    Respiratory: Positive for shortness of breath.    Cardiovascular: Negative.    Endocrine: Negative.    Genitourinary: Negative.    Musculoskeletal: Negative.    Skin: Negative.    Allergic/Immunologic: Negative.    Neurological: Negative.    Hematological: Negative.    Psychiatric/Behavioral: Negative.        Cardiovascular Procedures    ECHO/MUGA:  STRESS TESTS:   CARDIAC CATH:   DEVICES:   HOLTER:   CT/MRI:   VASCULAR:   CARDIOTHORACIC:     Objective  Vitals:    03/13/23 1416   BP: 102/78   BP Location: Right arm   Patient Position: Sitting   Cuff Size: Adult   Pulse: 87   Resp: 18   Temp: 97.1 °F (36.2 °C)   TempSrc: Infrared   SpO2: 97%   Weight: 46.7 kg (103 lb)   Height: 165.1 cm (65\")   PainSc: 0-No pain     Body mass index is 17.14 kg/m².     Physical Exam  Constitutional:       Appearance: Healthy appearance. Not in distress.   Neck:      Vascular: No JVR. JVD normal.   Pulmonary:      Effort: Pulmonary effort is normal.      Breath sounds: Normal breath sounds. No wheezing. No rhonchi. No rales.   Chest:      Chest wall: Not tender to palpatation.   Cardiovascular:      PMI at left midclavicular line. Normal rate. Irregularly irregular rhythm. Normal S1. Normal S2.      Murmurs: There is no murmur.      No gallop. No click. No rub.   Pulses:     Intact distal pulses.   Edema:     Peripheral edema absent.   Abdominal:      General: Bowel sounds are normal.      Palpations: Abdomen is soft.      " Tenderness: There is no abdominal tenderness.   Musculoskeletal: Normal range of motion.         General: No tenderness. Skin:     General: Skin is warm and dry.   Neurological:      General: No focal deficit present.      Mental Status: Alert and oriented to person, place and time.          Diagnostic Data  Procedures    Assessment and Plan  Diagnoses and all orders for this visit:    Atrial fibrillation, chronic (HCC)- rate control on Toprol xl 50 mg and Eliquis, her lab from PCP looks good, HB is 11?    Chronic diastolic heart failure (HCC)- On torsemide 10 mg, steady, no edema, some SOB    Chronic pulmonary embolism, unspecified pulmonary embolism type, unspecified whether acute cor pulmonale present (HCC)- On Eliquis 2,5 bid     Essential hypertension- Only on Torsemide 10 mg daily  Hyperlipidemia LDL goal <100- On Crestor  20 mg    Centrilobular emphysema (HCC)      -              Return in about 6 months (around 9/13/2023) for Recheck with Dr. Bradshaw.    Rufus Bradshaw MD  03/13/2023

## 2023-08-31 DIAGNOSIS — I48.20 ATRIAL FIBRILLATION, CHRONIC: ICD-10-CM

## 2023-08-31 DIAGNOSIS — I10 ESSENTIAL HYPERTENSION: ICD-10-CM

## 2023-08-31 RX ORDER — METOPROLOL SUCCINATE 50 MG
TABLET, EXTENDED RELEASE 24 HR ORAL
Qty: 90 TABLET | Refills: 3 | Status: SHIPPED | OUTPATIENT
Start: 2023-08-31

## 2023-09-07 RX ORDER — ROSUVASTATIN CALCIUM 20 MG/1
TABLET, COATED ORAL
Qty: 90 TABLET | Refills: 0 | Status: SHIPPED | OUTPATIENT
Start: 2023-09-07

## 2023-09-08 DIAGNOSIS — R06.02 SHORTNESS OF BREATH: ICD-10-CM

## 2023-09-08 RX ORDER — TORSEMIDE 10 MG/1
TABLET ORAL
Qty: 90 TABLET | Refills: 1 | Status: SHIPPED | OUTPATIENT
Start: 2023-09-08

## 2023-09-26 RX ORDER — POTASSIUM CHLORIDE 1500 MG/1
TABLET, EXTENDED RELEASE ORAL
Qty: 180 TABLET | Refills: 3 | Status: SHIPPED | OUTPATIENT
Start: 2023-09-26

## 2023-10-09 RX ORDER — APIXABAN 2.5 MG/1
2.5 TABLET, FILM COATED ORAL EVERY 12 HOURS
Qty: 60 TABLET | Refills: 0 | Status: SHIPPED | OUTPATIENT
Start: 2023-10-09

## 2023-12-04 RX ORDER — APIXABAN 2.5 MG/1
2.5 TABLET, FILM COATED ORAL EVERY 12 HOURS
Qty: 60 TABLET | Refills: 3 | Status: SHIPPED | OUTPATIENT
Start: 2023-12-04

## 2023-12-05 RX ORDER — ROSUVASTATIN CALCIUM 20 MG/1
20 TABLET, COATED ORAL DAILY
Qty: 90 TABLET | Refills: 0 | Status: SHIPPED | OUTPATIENT
Start: 2023-12-05

## 2024-02-24 ENCOUNTER — LAB REQUISITION (OUTPATIENT)
Dept: LAB | Facility: HOSPITAL | Age: 89
End: 2024-02-24
Payer: MEDICARE

## 2024-02-24 DIAGNOSIS — Z82.3 FAMILY HISTORY OF STROKE: ICD-10-CM

## 2024-02-24 DIAGNOSIS — J44.9 CHRONIC OBSTRUCTIVE PULMONARY DISEASE, UNSPECIFIED: ICD-10-CM

## 2024-02-24 DIAGNOSIS — R06.02 SHORTNESS OF BREATH: ICD-10-CM

## 2024-02-24 DIAGNOSIS — E78.41 ELEVATED LIPOPROTEIN(A): ICD-10-CM

## 2024-02-24 DIAGNOSIS — F32.A DEPRESSION, UNSPECIFIED: ICD-10-CM

## 2024-02-24 DIAGNOSIS — Z13.21 ENCOUNTER FOR SCREENING FOR NUTRITIONAL DISORDER: ICD-10-CM

## 2024-02-24 DIAGNOSIS — I48.91 UNSPECIFIED ATRIAL FIBRILLATION: ICD-10-CM

## 2024-02-24 DIAGNOSIS — I50.9 HEART FAILURE, UNSPECIFIED: ICD-10-CM

## 2024-02-24 LAB
25(OH)D3 SERPL-MCNC: 37.3 NG/ML (ref 30–100)
ALBUMIN SERPL-MCNC: 4.1 G/DL (ref 3.5–5.2)
ALBUMIN/GLOB SERPL: 1.4 G/DL
ALP SERPL-CCNC: 161 U/L (ref 39–117)
ALT SERPL W P-5'-P-CCNC: 16 U/L (ref 1–33)
ANION GAP SERPL CALCULATED.3IONS-SCNC: 9 MMOL/L (ref 5–15)
AST SERPL-CCNC: 23 U/L (ref 1–32)
BASOPHILS # BLD AUTO: 0.02 10*3/MM3 (ref 0–0.2)
BASOPHILS NFR BLD AUTO: 0.2 % (ref 0–1.5)
BILIRUB SERPL-MCNC: 0.8 MG/DL (ref 0–1.2)
BUN SERPL-MCNC: 20 MG/DL (ref 8–23)
BUN/CREAT SERPL: 22.7 (ref 7–25)
CALCIUM SPEC-SCNC: 9.7 MG/DL (ref 8.6–10.5)
CHLORIDE SERPL-SCNC: 98 MMOL/L (ref 98–107)
CO2 SERPL-SCNC: 33 MMOL/L (ref 22–29)
CREAT SERPL-MCNC: 0.88 MG/DL (ref 0.57–1)
DEPRECATED RDW RBC AUTO: 49.3 FL (ref 37–54)
EGFRCR SERPLBLD CKD-EPI 2021: 63.7 ML/MIN/1.73
EOSINOPHIL # BLD AUTO: 0.08 10*3/MM3 (ref 0–0.4)
EOSINOPHIL NFR BLD AUTO: 0.9 % (ref 0.3–6.2)
ERYTHROCYTE [DISTWIDTH] IN BLOOD BY AUTOMATED COUNT: 14.4 % (ref 12.3–15.4)
GLOBULIN UR ELPH-MCNC: 2.9 GM/DL
GLUCOSE SERPL-MCNC: 120 MG/DL (ref 65–99)
HCT VFR BLD AUTO: 31.9 % (ref 34–46.6)
HGB BLD-MCNC: 10.4 G/DL (ref 12–15.9)
IMM GRANULOCYTES # BLD AUTO: 0.05 10*3/MM3 (ref 0–0.05)
IMM GRANULOCYTES NFR BLD AUTO: 0.6 % (ref 0–0.5)
LYMPHOCYTES # BLD AUTO: 1.23 10*3/MM3 (ref 0.7–3.1)
LYMPHOCYTES NFR BLD AUTO: 14.6 % (ref 19.6–45.3)
MAGNESIUM SERPL-MCNC: 2.3 MG/DL (ref 1.6–2.4)
MCH RBC QN AUTO: 30.9 PG (ref 26.6–33)
MCHC RBC AUTO-ENTMCNC: 32.6 G/DL (ref 31.5–35.7)
MCV RBC AUTO: 94.7 FL (ref 79–97)
MONOCYTES # BLD AUTO: 0.51 10*3/MM3 (ref 0.1–0.9)
MONOCYTES NFR BLD AUTO: 6 % (ref 5–12)
NEUTROPHILS NFR BLD AUTO: 6.54 10*3/MM3 (ref 1.7–7)
NEUTROPHILS NFR BLD AUTO: 77.7 % (ref 42.7–76)
NRBC BLD AUTO-RTO: 0 /100 WBC (ref 0–0.2)
PLATELET # BLD AUTO: 266 10*3/MM3 (ref 140–450)
PMV BLD AUTO: 12.3 FL (ref 6–12)
POTASSIUM SERPL-SCNC: 4.2 MMOL/L (ref 3.5–5.2)
PROT SERPL-MCNC: 7 G/DL (ref 6–8.5)
RBC # BLD AUTO: 3.37 10*6/MM3 (ref 3.77–5.28)
SODIUM SERPL-SCNC: 140 MMOL/L (ref 136–145)
T4 FREE SERPL-MCNC: 1.15 NG/DL (ref 0.93–1.7)
TSH SERPL DL<=0.05 MIU/L-ACNC: 0.84 UIU/ML (ref 0.27–4.2)
VIT B12 BLD-MCNC: 953 PG/ML (ref 211–946)
WBC NRBC COR # BLD AUTO: 8.43 10*3/MM3 (ref 3.4–10.8)

## 2024-02-24 PROCEDURE — 82306 VITAMIN D 25 HYDROXY: CPT | Performed by: NURSE PRACTITIONER

## 2024-02-24 PROCEDURE — 80053 COMPREHEN METABOLIC PANEL: CPT | Performed by: NURSE PRACTITIONER

## 2024-02-24 PROCEDURE — 82607 VITAMIN B-12: CPT | Performed by: NURSE PRACTITIONER

## 2024-02-24 PROCEDURE — 83735 ASSAY OF MAGNESIUM: CPT | Performed by: NURSE PRACTITIONER

## 2024-02-24 PROCEDURE — 85025 COMPLETE CBC W/AUTO DIFF WBC: CPT | Performed by: NURSE PRACTITIONER

## 2024-02-24 PROCEDURE — 84443 ASSAY THYROID STIM HORMONE: CPT | Performed by: NURSE PRACTITIONER

## 2024-02-24 PROCEDURE — 84439 ASSAY OF FREE THYROXINE: CPT | Performed by: NURSE PRACTITIONER

## 2024-03-08 DIAGNOSIS — R06.02 SHORTNESS OF BREATH: ICD-10-CM

## 2024-03-08 RX ORDER — TORSEMIDE 10 MG/1
10 TABLET ORAL DAILY
Qty: 30 TABLET | Refills: 0 | Status: SHIPPED | OUTPATIENT
Start: 2024-03-08

## 2024-08-08 DIAGNOSIS — R06.02 SHORTNESS OF BREATH: ICD-10-CM

## 2024-08-08 RX ORDER — TORSEMIDE 10 MG/1
10 TABLET ORAL DAILY
Qty: 30 TABLET | Refills: 0 | Status: SHIPPED | OUTPATIENT
Start: 2024-08-08